# Patient Record
Sex: FEMALE | Race: WHITE | NOT HISPANIC OR LATINO | Employment: FULL TIME | URBAN - METROPOLITAN AREA
[De-identification: names, ages, dates, MRNs, and addresses within clinical notes are randomized per-mention and may not be internally consistent; named-entity substitution may affect disease eponyms.]

---

## 2019-07-15 ENCOUNTER — TRANSFERRED RECORDS (OUTPATIENT)
Dept: MULTI SPECIALTY CLINIC | Facility: CLINIC | Age: 24
End: 2019-07-15

## 2019-07-15 LAB
C TRACH DNA SPEC QL PROBE+SIG AMP: NOT DETECTED
PAP-ABSTRACT: NORMAL
PAP-ABSTRACT: NORMAL

## 2022-10-12 ENCOUNTER — TELEPHONE (OUTPATIENT)
Dept: MIDWIFE SERVICES | Facility: CLINIC | Age: 27
End: 2022-10-12

## 2022-10-21 ENCOUNTER — TELEPHONE (OUTPATIENT)
Dept: OBGYN | Facility: CLINIC | Age: 27
End: 2022-10-21

## 2022-10-21 ENCOUNTER — HOSPITAL ENCOUNTER (OUTPATIENT)
Dept: ULTRASOUND IMAGING | Facility: HOSPITAL | Age: 27
Discharge: HOME | End: 2022-10-21
Attending: ADVANCED PRACTICE MIDWIFE | Admitting: ADVANCED PRACTICE MIDWIFE
Payer: COMMERCIAL

## 2022-10-21 ENCOUNTER — PRENATAL OFFICE VISIT (OUTPATIENT)
Dept: MIDWIFE SERVICES | Facility: CLINIC | Age: 27
End: 2022-10-21
Payer: COMMERCIAL

## 2022-10-21 VITALS
HEIGHT: 68 IN | HEART RATE: 64 BPM | OXYGEN SATURATION: 99 % | WEIGHT: 155 LBS | BODY MASS INDEX: 23.49 KG/M2 | DIASTOLIC BLOOD PRESSURE: 72 MMHG | SYSTOLIC BLOOD PRESSURE: 118 MMHG

## 2022-10-21 DIAGNOSIS — Z13.79 GENETIC SCREENING: ICD-10-CM

## 2022-10-21 DIAGNOSIS — N39.0 RECURRENT URINARY TRACT INFECTION: ICD-10-CM

## 2022-10-21 DIAGNOSIS — Z98.891 HISTORY OF CESAREAN DELIVERY: ICD-10-CM

## 2022-10-21 DIAGNOSIS — O02.1 MISSED ABORTION: Primary | ICD-10-CM

## 2022-10-21 DIAGNOSIS — O09.91 SUPERVISION OF HIGH RISK PREGNANCY IN FIRST TRIMESTER: ICD-10-CM

## 2022-10-21 DIAGNOSIS — O09.91 SUPERVISION OF HIGH RISK PREGNANCY IN FIRST TRIMESTER: Primary | ICD-10-CM

## 2022-10-21 LAB
ABO/RH(D): NORMAL
ANTIBODY SCREEN: NEGATIVE
ERYTHROCYTE [DISTWIDTH] IN BLOOD BY AUTOMATED COUNT: 12.1 % (ref 10–15)
HCT VFR BLD AUTO: 38.5 % (ref 35–47)
HGB BLD-MCNC: 13.1 G/DL (ref 11.7–15.7)
HGB BLD-MCNC: 13.2 G/DL (ref 11.7–15.7)
MCH RBC QN AUTO: 32.1 PG (ref 26.5–33)
MCHC RBC AUTO-ENTMCNC: 34 G/DL (ref 31.5–36.5)
MCV RBC AUTO: 94 FL (ref 78–100)
PLATELET # BLD AUTO: 222 10E3/UL (ref 150–450)
RBC # BLD AUTO: 4.08 10E6/UL (ref 3.8–5.2)
SPECIMEN EXPIRATION DATE: NORMAL
SPECIMEN EXPIRATION DATE: NORMAL
WBC # BLD AUTO: 7.8 10E3/UL (ref 4–11)

## 2022-10-21 PROCEDURE — 83655 ASSAY OF LEAD: CPT | Mod: 90 | Performed by: ADVANCED PRACTICE MIDWIFE

## 2022-10-21 PROCEDURE — 99205 OFFICE O/P NEW HI 60 MIN: CPT | Performed by: ADVANCED PRACTICE MIDWIFE

## 2022-10-21 PROCEDURE — 87340 HEPATITIS B SURFACE AG IA: CPT | Performed by: ADVANCED PRACTICE MIDWIFE

## 2022-10-21 PROCEDURE — 87389 HIV-1 AG W/HIV-1&-2 AB AG IA: CPT | Performed by: ADVANCED PRACTICE MIDWIFE

## 2022-10-21 PROCEDURE — 86780 TREPONEMA PALLIDUM: CPT | Performed by: ADVANCED PRACTICE MIDWIFE

## 2022-10-21 PROCEDURE — 86762 RUBELLA ANTIBODY: CPT | Performed by: ADVANCED PRACTICE MIDWIFE

## 2022-10-21 PROCEDURE — 85027 COMPLETE CBC AUTOMATED: CPT | Performed by: ADVANCED PRACTICE MIDWIFE

## 2022-10-21 PROCEDURE — 99000 SPECIMEN HANDLING OFFICE-LAB: CPT | Performed by: ADVANCED PRACTICE MIDWIFE

## 2022-10-21 PROCEDURE — 86803 HEPATITIS C AB TEST: CPT | Performed by: ADVANCED PRACTICE MIDWIFE

## 2022-10-21 PROCEDURE — 36415 COLL VENOUS BLD VENIPUNCTURE: CPT | Performed by: ADVANCED PRACTICE MIDWIFE

## 2022-10-21 PROCEDURE — 86850 RBC ANTIBODY SCREEN: CPT | Performed by: ADVANCED PRACTICE MIDWIFE

## 2022-10-21 PROCEDURE — 87186 SC STD MICRODIL/AGAR DIL: CPT | Performed by: ADVANCED PRACTICE MIDWIFE

## 2022-10-21 PROCEDURE — 76801 OB US < 14 WKS SINGLE FETUS: CPT

## 2022-10-21 PROCEDURE — 84443 ASSAY THYROID STIM HORMONE: CPT | Performed by: ADVANCED PRACTICE MIDWIFE

## 2022-10-21 PROCEDURE — 87491 CHLMYD TRACH DNA AMP PROBE: CPT | Performed by: ADVANCED PRACTICE MIDWIFE

## 2022-10-21 PROCEDURE — 86900 BLOOD TYPING SEROLOGIC ABO: CPT | Performed by: ADVANCED PRACTICE MIDWIFE

## 2022-10-21 PROCEDURE — 87591 N.GONORRHOEAE DNA AMP PROB: CPT | Performed by: ADVANCED PRACTICE MIDWIFE

## 2022-10-21 PROCEDURE — 87086 URINE CULTURE/COLONY COUNT: CPT | Performed by: ADVANCED PRACTICE MIDWIFE

## 2022-10-21 PROCEDURE — 86901 BLOOD TYPING SEROLOGIC RH(D): CPT | Performed by: ADVANCED PRACTICE MIDWIFE

## 2022-10-21 RX ORDER — BIOTIN 10000 MCG
CAPSULE ORAL
Status: ON HOLD | COMMUNITY
End: 2023-11-09

## 2022-10-21 RX ORDER — MULTIPLE VITAMINS W/ MINERALS TAB 9MG-400MCG
1 TAB ORAL DAILY
COMMUNITY

## 2022-10-21 RX ORDER — CHLORAL HYDRATE 500 MG
2 CAPSULE ORAL DAILY
COMMUNITY

## 2022-10-21 NOTE — TELEPHONE ENCOUNTER
PHONE CALL TO ON-CALL CNM / THEN CNM CALLED PATIENT:  CNM was paged by Dr Patterson of Radiology.    Fabienne was just in for a 1st trimester ultrasound.  Finding of an 8.3 week pregnancy with no heartbeat.  Patient should be 11 weeks by clinical dating.    CNM then spoke with Fabienne and her partner via phone. They were sad about the news, but had known already as the US tech had shared with them during the US that there was no heartbeat.    The couple wanted to know their options.  Discussed expectant management vs medical management vs surgical management with a D&C.  The coupel are uncertain what they will ultimately do, but know they are interested in speaking with a physician to know more about the D&C option.  Referral made to [facility].  They will call the clinic Monday morning.    In the meantime, they will call on-call CNM with bleeding/cramping or with any questions through the weekend.

## 2022-10-21 NOTE — PROGRESS NOTES
PRENATAL VISIT   FIRST OBSTETRICAL EXAM - OB     Assessment / Impression   1.  27-year-old  2 para 1-0-0-1 with presumed IUP at 11 weeks 0 days by approximate LMP  2.  History of  birth for fetal intolerance of labor  3.  Pregravid BMI 24  4.  Recurrent UTIs  5.  History of macrosomic baby  6.  Genetic screening    Plan:   -First trimester ultrasound for dating and viability ordered.  -IOB labs drawn.   -RTC in 2 weeks to hear fetal heart tones and for both influenza and COVID-19 vaccines.  -Pt is a candidate for drawing lead level per [facility] screening tool.   -Patient is interested in waterbirth.    -Reviewed prenatal care schedule.   -Optimal nutrition and weight gain discussed. Pregnancy weight gain of 25-35 lbs (BMI 18.5-24.9) encouraged.   -Anticipatory guidance for common pregnancy questions and concerns reviewed.   -Danger s/sx for this trimester reviewed with patient.   -Reviewed genetic screening options with patient, patient does elect for noninvasive prenatal testing.  She DECLINES first trimester screening including lab work and early ultrasound with M. The patient does not elect for quad screening.   -Reviewed carrier testing options with patient, patient does not elect for testing or referral to genetic counseling.  -IOB packet given and reviewed with patient.   -CNM services and hospital options reviewed; emergency and scheduling phone numbers given to patient.   -Because the patient does not have 1 high risk nor 2 or more moderate risk factors, low-dose aspirin not be initiated.   -Antepartum VTE risk factors absent.  -Patient is not at increased risk for overt diabetes, so early 1hr GCT will not be added to IOB labs today.  -Pt is a candidate for an antepartum OB consult.  Between 24 and 28 weeks, she will be referred to OB/GYN for a TOLAC/ consult.  -Return to clinic in 4 weeks or sooner as needed.    Total time: 70 minutes spent on the date of the encounter doing chart review,  review of test results, patient visit and documentation.     Subjective:   Fabienne Clemente is a 27 year old G 2 P 1001 here today for her first obstetrical exam at 11 w 0 d. Here with Nikhil. This pregnancy is planned. The patient reports nausea, but no vomiting, fatigue and some mild breast tenderness.  Patient's last menstrual period was 2022 (approximate)., predicting an expected date of delivery of Estimated Date of Delivery: May 12, 2023. Last period was regular. Her previous three cycles were regular. Her pregnancy is dated by approximate LMP.     The patient states that she is in a monogamous relationship. Offered GC/CT screening today and patient accepts.  Current symptoms also include: breast tenderness, nausea, positive home pregnancy test and Although symptoms of pregnancy have waned recently..     Risk factors: None. Pregnancy Risk Factors:History of  birth    The patient has the following high risk factors for preeclampsia:none. The patient has the following moderate risk factors for preeclampsia:none.     The patient has the following antepartum risk factors for VTE (two or more risk factors, or 1 * risk factor, places patient at higher risk): none.   Clinical history/risk factors requiring antepartum OB consult: history of  section- desires TOLAC.     The patient has the following risk factors for overt diabetes: Not at increased risk, BMI normal (or under 23 for  Americans). Plus the additional risk factor(s) of: No additional risk factors.    Social History:   Education level: College graduate  Occupation:   Partners name: Nikhil,   ?   OB History    Para Term  AB Living   2 1 1 0 0 1   SAB IAB Ectopic Multiple Live Births   0 0 0 0 1      # Outcome Date GA Lbr Jerzy/2nd Weight Sex Delivery Anes PTL Lv   2 Current            1 Term 21 40w5d  4.167 kg (9 lb 3 oz) F CS-LTranv  N CLAUDIO      Birth Comments: fetal intolerance at 7 cm      Name: Bridget  "     Apgar1: 8  Apgar5: 9        History:   Past Medical History:   Diagnosis Date     Recurrent urinary tract infection       Past Surgical History:   Procedure Laterality Date      SECTION        Family History   Problem Relation Age of Onset     No Known Problems Mother      No Known Problems Father      Bipolar Disorder Maternal Grandfather      Lung Cancer Paternal Grandmother      Colon Cancer Paternal Grandfather      Hypertension Paternal Grandfather      No Known Problems Sister      No Known Problems Daughter      No Known Problems Paternal Half-Brother      No Known Problems Paternal Half-Sister      No Known Problems Paternal Half-Sister       Social History     Tobacco Use     Smoking status: Never     Smokeless tobacco: Never   Substance Use Topics     Alcohol use: Not Currently     Drug use: Never      Current Outpatient Medications   Medication Sig Dispense Refill     Biotin 10 MG CAPS        fish oil-omega-3 fatty acids 1000 MG capsule Take 2 g by mouth daily       multivitamin w/minerals (THERA-VIT-M) tablet Take 1 tablet by mouth daily       Probiotic Product (PROBIOTIC-10 PO)        nitroFURantoin macrocrystal-monohydrate (MACROBID) 100 MG capsule Take 1 capsule (100 mg) by mouth 2 times daily 14 capsule 0      No Known Allergies     The patient's medical, surgical and family histories were reviewed and were not pertinent to this visit.     Pap smear: Last Pap: , Result: Normal, Previous History: Normal, Any history of abnormal: No. Next Due: .     EPDS score today:  .\"  0\" to #10   History of anxiety or depression: Denies    Review of Systems   General: Fatigue but otherwise denies problem   Eyes: Denies problem   Ears/Nose/Throat: Denies problem   Cardiovascular: Denies problem   Respiratory: Denies problem   Gastrointestinal: Nausea without vomiting, otherwise negative   Genitourinary: Denies any discharge, vaginal bleeding or itchiness or any other problem " "  Musculoskeletal: Breast tenderness otherwise denies problem   Skin: Denies problem   Neurologic: Denies problem   Psychiatric: Denies problem   Endocrine: Denies problem   Heme/Lymphatic: Denies problem   Allergic/Immunologic: Denies problem         Objective:   Objective    Vitals:    10/21/22 1521   BP: 118/72   Pulse: 64   SpO2: 99%   Weight: 70.3 kg (155 lb)   Height: 1.715 m (5' 7.5\")        Physical Exam:   General Appearance: Alert, cooperative, no distress, appears stated age   ALICIA: Normocephalic, without obvious abnormality, atraumatic. Conjunctiva/corneas clear, does not wear corrective lenses   Neck: Supple, symmetrical, trachea midline, no adenopathy.   Thyroid: not enlarged, symmetric, no tenderness/mass/nodules   Back: Symmetric, ROM normal   Breasts: Deferred  Abdomen: Soft, non-tender.   FHT: Not heard  Pelvic exam: Deferred  Musculoskeletal: Extremities normal, atraumatic, no cyanosis   Skin: Skin color, texture, turgor normal, no rashes or lesions   Neurologic: Alert and oriented x 3. Normal speech     "

## 2022-10-22 ENCOUNTER — TELEPHONE (OUTPATIENT)
Dept: MIDWIFE SERVICES | Facility: CLINIC | Age: 27
End: 2022-10-22

## 2022-10-22 ENCOUNTER — NURSE TRIAGE (OUTPATIENT)
Dept: NURSING | Facility: CLINIC | Age: 27
End: 2022-10-22

## 2022-10-22 DIAGNOSIS — N30.00 ACUTE CYSTITIS WITHOUT HEMATURIA: Primary | ICD-10-CM

## 2022-10-22 PROBLEM — O02.1 MISSED ABORTION: Status: ACTIVE | Noted: 2022-10-22

## 2022-10-22 LAB
BACTERIA UR CULT: ABNORMAL
C TRACH DNA SPEC QL NAA+PROBE: NEGATIVE
HBV SURFACE AG SERPL QL IA: NONREACTIVE
HCV AB SERPL QL IA: NONREACTIVE
HIV 1+2 AB+HIV1 P24 AG SERPL QL IA: NONREACTIVE
N GONORRHOEA DNA SPEC QL NAA+PROBE: NEGATIVE
T PALLIDUM AB SER QL: NONREACTIVE

## 2022-10-22 RX ORDER — NITROFURANTOIN 25; 75 MG/1; MG/1
100 CAPSULE ORAL 2 TIMES DAILY
Qty: 14 CAPSULE | Refills: 0 | Status: SHIPPED | OUTPATIENT
Start: 2022-10-22 | End: 2022-11-07

## 2022-10-23 ENCOUNTER — NURSE TRIAGE (OUTPATIENT)
Dept: NURSING | Facility: CLINIC | Age: 27
End: 2022-10-23

## 2022-10-23 DIAGNOSIS — N39.0 RECURRENT URINARY TRACT INFECTION: Primary | ICD-10-CM

## 2022-10-23 DIAGNOSIS — N30.00 ACUTE CYSTITIS WITHOUT HEMATURIA: ICD-10-CM

## 2022-10-23 PROBLEM — Z13.79 GENETIC SCREENING: Status: ACTIVE | Noted: 2022-10-23

## 2022-10-23 PROBLEM — O09.91 SUPERVISION OF HIGH RISK PREGNANCY IN FIRST TRIMESTER: Status: ACTIVE | Noted: 2022-10-23

## 2022-10-23 PROBLEM — Z98.891 HISTORY OF CESAREAN DELIVERY: Status: ACTIVE | Noted: 2022-10-23

## 2022-10-23 NOTE — TELEPHONE ENCOUNTER
OB Triage Call    Is patient's OB/Midwife with the [facility]? [facility]- Is patient's primary OB a Midwife? Yes- At this time we do not triage for the [facility] midwives.  Paged on-call Midwife Leola Nunes at 8:45PM to contact patient directly.     Pt is requesting to speak with the midwife about labs she had drawn.       11w1d    Estimated Date of Delivery: May 12, 2023        OB History    Para Term  AB Living   1 0 0 0 0 0   SAB IAB Ectopic Multiple Live Births   0 0 0 0 0      # Outcome Date GA Lbr Jerzy/2nd Weight Sex Delivery Anes PTL Lv   1 Current                No results found for: GBS       Katelyn Boss RN 10/22/22 8:49 PM  [facility] Nurse Advisor      Reason for Disposition    [1] Caller requests to speak ONLY to PCP AND [2] URGENT question    Additional Information    Negative: [1] Follow-up call from patient regarding patient's clinical status AND [2] information urgent    Negative: Lab or radiology calling with CRITICAL test results    Negative: Call about patient who is currently hospitalized    Negative: Doctor (or NP/PA) call to PCP    Negative: ED call to PCP (i.e., primary care provider; doctor, NP, or PA)    Negative: Lab calling with strep throat test results and triager can call in prescription    Negative: Lab calling with urinalysis test results and triager can call in prescription    Negative: Medication questions    Negative: Medication renewal and refill questions    Negative: Pre-operative or pre-procedural questions    Protocols used: [facility]

## 2022-10-23 NOTE — TELEPHONE ENCOUNTER
Call from patient who would like to the speak to one of the midwives at [facility]. She reports she spoke to Janet Costa today. She has questions about her miscarriage.    Caller was transferred to midwife line per policy.    [facility] RN/[facility] Nurse Advisors      Reason for Disposition    General information question, no triage required and triager able to answer question    Protocols used: INFORMATION ONLY CALL - NO TRIAGE-A-

## 2022-10-23 NOTE — TELEPHONE ENCOUNTER
"Telephone call from Fabienne wanting more information about details of ultrasound results. Specifically has questions about the language \"irregular gestational sac and yolk sac\". Wondering if this indicates a chromosomal abnormality. Discussed that this does not give us any information about the fetal chromosomes, but often occurs when a pregnancy is not continuing to grow. Patient states that she hopes to have a consult appointment with an OB as soon as possible and is wanting as many answers as she can get about why this happened in hopes to avoid a reaccurance. Validation of patient's feelings about wanting answers. Discussed that with first trimester miscarriages sometimes you are not able to get an answer to why, patient continues to be hopeful to get some answers. States that her uterus is off to her right, wondering if that could be a cause. Discussed that is not likely the cause, but encouraged patient ask as many questions as she needs of the OB and advocate for any testing she would find helpful. Also reports no family hx on either side. Reviewed that miscarriages, especially first trimester can happen despite no risk factors or family history. Again validated patients desire to understand what happened despite the fact she may not get the answers she desires.  Discussed that in addition to a lab work, chromosomal testing can be completed on the POC if patient desires and may or may not give her answers. Patient states understanding and continues to feel very strongly that she needs answers. No further questions for this writer at this time.     GRIS Young, CJM  [facility] Nurse-Midwives [facility]    "

## 2022-10-24 ENCOUNTER — TELEPHONE (OUTPATIENT)
Dept: UROLOGY | Facility: CLINIC | Age: 27
End: 2022-10-24

## 2022-10-24 ENCOUNTER — TELEPHONE (OUTPATIENT)
Dept: MIDWIFE SERVICES | Facility: CLINIC | Age: 27
End: 2022-10-24

## 2022-10-24 ENCOUNTER — TELEPHONE (OUTPATIENT)
Dept: MATERNAL FETAL MEDICINE | Facility: CLINIC | Age: 27
End: 2022-10-24

## 2022-10-24 ENCOUNTER — MYC MEDICAL ADVICE (OUTPATIENT)
Dept: MIDWIFE SERVICES | Facility: CLINIC | Age: 27
End: 2022-10-24

## 2022-10-24 DIAGNOSIS — O02.1 MISSED ABORTION: Primary | ICD-10-CM

## 2022-10-24 LAB
RUBV IGG SERPL QL IA: 1.89 INDEX
RUBV IGG SERPL QL IA: POSITIVE
TSH SERPL DL<=0.005 MIU/L-ACNC: 1.34 UIU/ML (ref 0.3–4.2)

## 2022-10-24 NOTE — TELEPHONE ENCOUNTER
Left generic VM requesting callback to PCC line. Need to clarify type of testing pt desires, whether she has elected for surgical management of missed AB, and whether she will be receiving further OB care from [facility]. Will await callback.     Pt returning call. Clarified that [facility] for management of [facility]AB. Pt strongly desires genetic testing of POC, clarified that [facility] (or OB service providing surgical procedure) would be the coordinator of this testing and that she can discuss that with [facility] when they return her call. Pt aware that we could assist her in coordinating D&C and POC testing at [facility]. Pt also desires carrier screening for herself and partner. Order placed for scheduling with [facility].

## 2022-10-24 NOTE — TELEPHONE ENCOUNTER
[facility] Call Center    Phone Message    May a detailed message be left on voicemail: yes     Reason for Call: Appointment Intake    Referring Provider Name: Janet Costa PITER    Diagnosis and/or Symptoms: Elease evaluate for recurrent urinary tract infections since childhood. Patient recently suffered a miscarriage and is currently being treated for an asymptomatic urinary tract infection detected on a urine culture at her initial OB visit.    Patient being referred for Urgent Appointment (Priority: 1-2 Weeks) by referring provider. Sending encounter message for clinic review and follow-up with patient for scheduling.     Action Taken: Message routed to:  [facility] Urology     Travel Screening: Not Applicable

## 2022-10-24 NOTE — TELEPHONE ENCOUNTER
Patient diagnosed with 8+3-week miscarriage on Friday, 10/21/2022.  Requesting referral for genetic counseling and testing.  Referral placed to [facility].  All questions answered.

## 2022-10-25 ENCOUNTER — LAB REQUISITION (OUTPATIENT)
Dept: LAB | Facility: CLINIC | Age: 27
End: 2022-10-25
Payer: COMMERCIAL

## 2022-10-25 ENCOUNTER — TRANSFERRED RECORDS (OUTPATIENT)
Dept: HEALTH INFORMATION MANAGEMENT | Facility: CLINIC | Age: 27
End: 2022-10-25

## 2022-10-25 ENCOUNTER — HOSPITAL ENCOUNTER (OUTPATIENT)
Facility: AMBULATORY SURGERY CENTER | Age: 27
End: 2022-10-25
Attending: OBSTETRICS & GYNECOLOGY
Payer: COMMERCIAL

## 2022-10-25 DIAGNOSIS — O02.1 MISSED ABORTION: ICD-10-CM

## 2022-10-25 LAB
FACTOR V INTERPRETATION: NORMAL
LAB DIRECTOR COMMENTS: NORMAL
LAB DIRECTOR DISCLAIMER: NORMAL
LAB DIRECTOR INTERPRETATION: NORMAL
LAB DIRECTOR METHODOLOGY: NORMAL
LAB DIRECTOR RESULTS: NORMAL
LEAD BLDV-MCNC: <2 UG/DL
SPECIMEN DESCRIPTION: NORMAL

## 2022-10-25 PROCEDURE — 85730 THROMBOPLASTIN TIME PARTIAL: CPT | Mod: ORL | Performed by: OBSTETRICS & GYNECOLOGY

## 2022-10-25 PROCEDURE — 85300 ANTITHROMBIN III ACTIVITY: CPT | Mod: ORL | Performed by: OBSTETRICS & GYNECOLOGY

## 2022-10-25 PROCEDURE — 85303 CLOT INHIBIT PROT C ACTIVITY: CPT | Mod: ORL | Performed by: OBSTETRICS & GYNECOLOGY

## 2022-10-25 PROCEDURE — 86777 TOXOPLASMA ANTIBODY: CPT | Mod: ORL | Performed by: OBSTETRICS & GYNECOLOGY

## 2022-10-25 PROCEDURE — 81241 F5 GENE: CPT | Mod: ORL | Performed by: OBSTETRICS & GYNECOLOGY

## 2022-10-25 RX ORDER — LIDOCAINE 40 MG/G
CREAM TOPICAL
Status: CANCELLED | OUTPATIENT
Start: 2022-10-25

## 2022-10-25 RX ORDER — ONDANSETRON 2 MG/ML
4 INJECTION INTRAMUSCULAR; INTRAVENOUS EVERY 30 MIN PRN
Status: CANCELLED | OUTPATIENT
Start: 2022-10-25

## 2022-10-25 RX ORDER — FENTANYL CITRATE 0.05 MG/ML
25 INJECTION, SOLUTION INTRAMUSCULAR; INTRAVENOUS
Status: CANCELLED | OUTPATIENT
Start: 2022-10-25

## 2022-10-25 RX ORDER — OXYCODONE HYDROCHLORIDE 5 MG/1
5 TABLET ORAL EVERY 4 HOURS PRN
Status: CANCELLED | OUTPATIENT
Start: 2022-10-25

## 2022-10-25 RX ORDER — HYDROMORPHONE HCL IN WATER/PF 6 MG/30 ML
0.2 PATIENT CONTROLLED ANALGESIA SYRINGE INTRAVENOUS EVERY 5 MIN PRN
Status: CANCELLED | OUTPATIENT
Start: 2022-10-25

## 2022-10-25 RX ORDER — SODIUM CHLORIDE, SODIUM LACTATE, POTASSIUM CHLORIDE, CALCIUM CHLORIDE 600; 310; 30; 20 MG/100ML; MG/100ML; MG/100ML; MG/100ML
INJECTION, SOLUTION INTRAVENOUS CONTINUOUS
Status: CANCELLED | OUTPATIENT
Start: 2022-10-25

## 2022-10-25 RX ORDER — ONDANSETRON 4 MG/1
4 TABLET, ORALLY DISINTEGRATING ORAL EVERY 30 MIN PRN
Status: CANCELLED | OUTPATIENT
Start: 2022-10-25

## 2022-10-25 RX ORDER — FENTANYL CITRATE 0.05 MG/ML
25 INJECTION, SOLUTION INTRAMUSCULAR; INTRAVENOUS EVERY 5 MIN PRN
Status: CANCELLED | OUTPATIENT
Start: 2022-10-25

## 2022-10-25 RX ORDER — MEPERIDINE HYDROCHLORIDE 25 MG/ML
12.5 INJECTION INTRAMUSCULAR; INTRAVENOUS; SUBCUTANEOUS
Status: CANCELLED | OUTPATIENT
Start: 2022-10-25

## 2022-10-25 NOTE — TELEPHONE ENCOUNTER
MEDICAL RECORDS REQUEST   [facility]  Urology Clinic  [address] [place] [address]  PHONE: [phone]  Fax: [phone]        FUTURE VISIT INFORMATION                                                   Fabienne J May, : 1995 scheduled for future visit at [facility]    APPOINTMENT INFORMATION:    Date: 2022    Provider:  Rebecca Honeycutt CNP    Reason for Visit/Diagnosis: Recurrent urinary tract infections since childhood    REFERRAL INFORMATION:    Referring provider:  Janet Costa CNM in [facility]    RECORDS REQUESTED FOR VISIT                                                     NOTES  STATUS/DETAILS   OFFICE NOTE from referring provider  yes, 10/21/2022 -- Janet Costa CNM in [facility]   OFFICE NOTE from other specialist  yes, 2022 -- Balbina Malcolm APRN, CNM @   07/15/2019 -- Kadie Gilliam APRN, CNP @    MEDICATION LIST  yes   LABS     URINALYSIS (UA)  yes     PRE-VISIT CHECKLIST      Record collection complete Yes   Appointment appropriately scheduled           (right time/right provider) Yes   Joint diagnostic appointment coordinated correctly          (ensure right order & amount of time) Yes   MyChart activation Yes   Questionnaire complete If no, please explain pending

## 2022-10-27 ENCOUNTER — PRE VISIT (OUTPATIENT)
Dept: UROLOGY | Facility: CLINIC | Age: 27
End: 2022-10-27

## 2022-10-27 LAB
T GONDII IGG SER-ACNC: <3 IU/ML
T GONDII IGM SER-ACNC: <3 AU/ML

## 2022-10-27 PROCEDURE — G0452 MOLECULAR PATHOLOGY INTERPR: HCPCS | Mod: 26 | Performed by: STUDENT IN AN ORGANIZED HEALTH CARE EDUCATION/TRAINING PROGRAM

## 2022-10-27 NOTE — TELEPHONE ENCOUNTER
Reason for visit: consult      Dx/Hx/Sx: Princess since childhood, recent miscarriage     Records/imaging/labs/orders: in epic     At Rooming: standard

## 2022-10-28 LAB
AT III ACT/NOR PPP CHRO: 103 % (ref 85–135)
DRVVT SCREEN RATIO: 0.88
INR PPP: 1.08 (ref 0.85–1.15)
LA PPP-IMP: NEGATIVE
LUPUS INTERPRETATION: NORMAL
PROT C ACT/NOR PPP CHRO: 87 % (ref 70–170)
PTT RATIO: 1.03
THROMBIN TIME: 17 SECONDS (ref 13–19)

## 2022-10-30 ENCOUNTER — TELEPHONE (OUTPATIENT)
Dept: MIDWIFE SERVICES | Facility: CLINIC | Age: 27
End: 2022-10-30

## 2022-10-30 ENCOUNTER — DOCUMENTATION ONLY (OUTPATIENT)
Dept: MIDWIFE SERVICES | Facility: CLINIC | Age: 27
End: 2022-10-30

## 2022-10-30 NOTE — TELEPHONE ENCOUNTER
A: 1.  27-year-old  2 para 1-0-1-1  2.  Spontaneous miscarriage of IUP at 8 weeks 4 days    P: Emotional support and therapeutic listening provided.  Normal course following miscarriage reviewed.  This writer will speak with in-house obstetrician from [facility] in the morning to ask about the logistics of sending products of conception tissue in to the office or through [employer] for chromosomal analysis.  Recommend home urine hCG in 2 weeks.  If it is still positive, follow-up with CNM or OB/GYN.  No ultrasound indicated at this time.    S: Experienced miscarriage tonight after taking black cohosh and vitamin C over the last couple of days.  She saw Dr. Cunningham at [facility] on Tuesday, 10/25/2022.  She has a container in order to send products of conception in for chromosomal testing.  She like to know what to do with the container since it is late Saturday night.  She has [employer] information but tomorrow is .  After passing clots and tissue on the toilet, her bleeding has become like a period.  She is not cramping anymore.  She did not need to take the prescribed misoprostol from Dr. Cunningham.    O: Alert and in no apparent distress    10/30/2022 9:46 AM    Spoke with Dr. Khan who will contact the patient regarding process for chromosomal analysis of products of conception after miscarriage.

## 2022-10-30 NOTE — PROGRESS NOTES
Phone Note: Fabienne Clemente is a 27 year old  who recently miscarried and called the on-call CNM regarding bleeding questions. She believes she passed the POC last evening around 1030pm, but since then she has noticed increased cramping and passing of blood clots. She notices blood clots both smaller, like dime size, and bigger, like ping pong ball size today. Last night she did have some golf ball size clots as well. She doesn't feel like the bleeding is slowing down since she passed what she thought was the baby. She is feeling dizzy as well. Temp has been around 99 F. We discussed normal course of bleeding with miscarriage and this does seem to be outside of that normal range, particularly due to her continued cramping, clots, and dizziness. She doesn't think she is eating and drinking enough at this time so the dizziness may be related to that as well. I encouraged she go in to the ER for evaluation to ensure her bleeding is under control and all POC have passed. She is aware that an US would help determine this. She is hesitant to want to go int but aware that it is recommended. She desires to wait at home for one more hour to see if the bleeding slows and cramping improves. I recommended if she doesn't see a big change in her bleeding, to go in. She does have someone to drive her to the ER.

## 2022-10-31 LAB — SCANNED LAB RESULT: ABNORMAL

## 2022-11-01 ENCOUNTER — OFFICE VISIT (OUTPATIENT)
Dept: UROLOGY | Facility: CLINIC | Age: 27
End: 2022-11-01
Payer: COMMERCIAL

## 2022-11-01 ENCOUNTER — PRE VISIT (OUTPATIENT)
Dept: UROLOGY | Facility: CLINIC | Age: 27
End: 2022-11-01

## 2022-11-01 VITALS
HEART RATE: 81 BPM | HEIGHT: 67 IN | BODY MASS INDEX: 24.01 KG/M2 | SYSTOLIC BLOOD PRESSURE: 145 MMHG | WEIGHT: 153 LBS | DIASTOLIC BLOOD PRESSURE: 80 MMHG

## 2022-11-01 DIAGNOSIS — N39.0 RECURRENT URINARY TRACT INFECTION: ICD-10-CM

## 2022-11-01 DIAGNOSIS — N30.00 ACUTE CYSTITIS WITHOUT HEMATURIA: ICD-10-CM

## 2022-11-01 PROCEDURE — 99204 OFFICE O/P NEW MOD 45 MIN: CPT | Performed by: NURSE PRACTITIONER

## 2022-11-01 RX ORDER — CEPHALEXIN 500 MG/1
500 CAPSULE ORAL PRN
Qty: 30 CAPSULE | Refills: 5 | Status: SHIPPED | OUTPATIENT
Start: 2022-11-01 | End: 2023-02-03

## 2022-11-01 ASSESSMENT — PAIN SCALES - GENERAL: PAINLEVEL: NO PAIN (0)

## 2022-11-01 NOTE — PATIENT INSTRUCTIONS
UROLOGY CLINIC VISIT PATIENT INSTRUCTIONS    Thanks for your patience with me today!    -We will obtain a renal ultrasound to check your kidneys to ensure nothing contributing to your infections.  -I have sent the Keflex antibiotic to your pharmacy in [place]. Take one dose after intercourse.   -Follow up with me in 3 months to check in via virtual visit.     If you have any issues, questions or concerns in the meantime, do not hesitate to contact us at [phone] or via [employer].     Rebecca Honeycutt, CNP  Department of Urology

## 2022-11-01 NOTE — PROGRESS NOTES
Assessment and Plan:     Assessment: 27 year old female with a history of recurrent UTIs, including recent asymptomatic bacteriuria at the time of unfortunately miscarriage. Infections for many years, dating back to childhood, with workup done at that time, though no records of this to review. Given her age, recommend a trial of post-coital prophylaxis to rule this out and she agrees with this plan. Will also obtain a GISELE to ensure no upper-tract nidus, including stones. PVR low today.     Plan:  -Start Keflex 500 mg as needed after intercourse.   -GISELE to rule out upper tract nidus.   -Follow up with me in 3 months to check in on progress with this. If she continues to get infections despite this, may consider referring for cystoscopy to evaluate lower urinary tract.       Rebecca Honeycutt, CNP  Department of Urology           Chief Complaint:   Recurrent UTIs         History of Present Illness:    Fabienne Clemente is a 27 year old female who presents for consult regarding recurrent UTIs. She recently suffered a miscarriage and was treated with an asymptomatic bacteriuria, with culture growing E.coli.     She is having a difficult time coping with her recent miscarriage. She wishes this was something that was discussed more openly to help normalize it.     Regarding her UTIs, she states that these date back to childhood. She recalls having a procedure done at a young age, which sounds like a cystoscopy by her description. She does not recall that anything came of this. Her UTIs worsened during her college years, which she does feel was likely due to an increase in new sexual partners. She is now  and monogamous and therefore no longer has this same exposure risk. She does not feel that there is a clear link between intercourse and infections. She does feel that her urine develops an unusual odor when she gets an infection. It does not smell bad, just unusual. She sometimes feels that she is not completely  "emptying her bladder.          Past Medical History:     Past Medical History:   Diagnosis Date     Recurrent urinary tract infection             Past Surgical History:     Past Surgical History:   Procedure Laterality Date      SECTION              Medications     Current Outpatient Medications   Medication     Biotin 10 MG CAPS     cephALEXin (KEFLEX) 500 MG capsule     fish oil-omega-3 fatty acids 1000 MG capsule     multivitamin w/minerals (THERA-VIT-M) tablet     Probiotic Product (PROBIOTIC-10 PO)     nitroFURantoin macrocrystal-monohydrate (MACROBID) 100 MG capsule     No current facility-administered medications for this visit.            Allergies:   Patient has no known allergies.         Review of Systems:  From intake questionnaire   Negative 14 system review except as noted on HPI, nurse's note.         Physical Exam:   Patient is a 27 year old  female   Vitals: Blood pressure (!) 145/80, pulse 81, height 1.695 m (5' 6.75\"), weight 69.4 kg (153 lb), not currently breastfeeding.  General Appearance Adult: Alert, no acute distress, oriented  Lungs: no respiratory distress, or pursed lip breathing  Heart: No obvious jugular venous distension present  Abdomen: soft, nontender, no organomegaly or masses, Body mass index is 24.14 kg/m .  : deferred     Uroflow and Post-Void Residual by Bladder Scan     PVR: 69 mL      Labs and Pathology:    I personally reviewed all applicable laboratory data and went over findings with patient  Significant for:    CBC RESULTS:  Recent Labs   Lab Test 10/21/22  1601   WBC 7.8   HGB 13.1  13.2          INR  Recent Labs   Lab Test 10/25/22  1524   INR 1.08     "

## 2022-11-01 NOTE — LETTER
11/1/2022       RE: Fabienne Clemente  [address]  [facility] [address]     Dear Colleague,    Thank you for referring your patient, Fabienne Clemente, to the [facility]OLOG[facility] [place] at [facility]. Please see a copy of my visit note below.         Assessment and Plan:     Assessment: 27 year old female with a history of recurrent UTIs, including recent asymptomatic bacteriuria at the time of unfortunately miscarriage. Infections for many years, dating back to childhood, with workup done at that time, though no records of this to review. Given her age, recommend a trial of post-coital prophylaxis to rule this out and she agrees with this plan. Will also obtain a GISELE to ensure no upper-tract nidus, including stones. PVR low today.     Plan:  -Start Keflex 500 mg as needed after intercourse.   -GISELE to rule out upper tract nidus.   -Follow up with me in 3 months to check in on progress with this. If she continues to get infections despite this, may consider referring for cystoscopy to evaluate lower urinary tract.       Rebecca Honeycutt, CNP  Department of Urology           Chief Complaint:   Recurrent UTIs         History of Present Illness:    Fabienne Clemente is a 27 year old female who presents for consult regarding recurrent UTIs. She recently suffered a miscarriage and was treated with an asymptomatic bacteriuria, with culture growing E.coli.     She is having a difficult time coping with her recent miscarriage. She wishes this was something that was discussed more openly to help normalize it.     Regarding her UTIs, she states that these date back to childhood. She recalls having a procedure done at a young age, which sounds like a cystoscopy by her description. She does not recall that anything came of this. Her UTIs worsened during her college years, which she does feel was likely due to an increase in new sexual partners. She is now  and monogamous  "and therefore no longer has this same exposure risk. She does not feel that there is a clear link between intercourse and infections. She does feel that her urine develops an unusual odor when she gets an infection. It does not smell bad, just unusual. She sometimes feels that she is not completely emptying her bladder.          Past Medical History:     Past Medical History:   Diagnosis Date     Recurrent urinary tract infection             Past Surgical History:     Past Surgical History:   Procedure Laterality Date      SECTION              Medications     Current Outpatient Medications   Medication     Biotin 10 MG CAPS     cephALEXin (KEFLEX) 500 MG capsule     fish oil-omega-3 fatty acids 1000 MG capsule     multivitamin w/minerals (THERA-VIT-M) tablet     Probiotic Product (PROBIOTIC-10 PO)     nitroFURantoin macrocrystal-monohydrate (MACROBID) 100 MG capsule     No current facility-administered medications for this visit.            Allergies:   Patient has no known allergies.         Review of Systems:  From intake questionnaire   Negative 14 system review except as noted on HPI, nurse's note.         Physical Exam:   Patient is a 27 year old  female   Vitals: Blood pressure (!) 145/80, pulse 81, height 1.695 m (5' 6.75\"), weight 69.4 kg (153 lb), not currently breastfeeding.  General Appearance Adult: Alert, no acute distress, oriented  Lungs: no respiratory distress, or pursed lip breathing  Heart: No obvious jugular venous distension present  Abdomen: soft, nontender, no organomegaly or masses, Body mass index is 24.14 kg/m .  : deferred     Uroflow and Post-Void Residual by Bladder Scan     PVR: 69 mL      Labs and Pathology:    I personally reviewed all applicable laboratory data and went over findings with patient  Significant for:    CBC RESULTS:  Recent Labs   Lab Test 10/21/22  1601   WBC 7.8   HGB 13.1  13.2          INR  Recent Labs   Lab Test 10/25/22  1524   INR 1.08         "

## 2022-11-01 NOTE — NURSING NOTE
"Chief Complaint   Patient presents with     Consult     Dysuria with frequency and dehydration        Blood pressure (!) 145/80, pulse 81, height 1.695 m (5' 6.75\"), weight 69.4 kg (153 lb), not currently breastfeeding. Body mass index is 24.14 kg/m .    Patient Active Problem List   Diagnosis     Missed      History of  delivery     Supervision of high risk pregnancy in first trimester     BMI 24.0-24.9, adult     Recurrent urinary tract infection     Macrosomic baby     Genetic screening     Acute cystitis without hematuria       No Known Allergies    Current Outpatient Medications   Medication Sig Dispense Refill     Biotin 10 MG CAPS        fish oil-omega-3 fatty acids 1000 MG capsule Take 2 g by mouth daily       multivitamin w/minerals (THERA-VIT-M) tablet Take 1 tablet by mouth daily       Probiotic Product (PROBIOTIC-10 PO)        nitroFURantoin macrocrystal-monohydrate (MACROBID) 100 MG capsule Take 1 capsule (100 mg) by mouth 2 times daily (Patient not taking: Reported on 2022) 14 capsule 0       Social History     Tobacco Use     Smoking status: Never     Smokeless tobacco: Never   Substance Use Topics     Alcohol use: Not Currently     Drug use: Never       Negar Alatorre CMA  2022  3:38 PM     "

## 2022-11-03 PROBLEM — O28.5 MATERNAL SERUM SCREEN POSITIVE FOR TRISOMY 21: Status: ACTIVE | Noted: 2022-11-03

## 2022-12-07 ENCOUNTER — ANCILLARY PROCEDURE (OUTPATIENT)
Dept: ULTRASOUND IMAGING | Facility: CLINIC | Age: 27
End: 2022-12-07
Attending: NURSE PRACTITIONER
Payer: COMMERCIAL

## 2022-12-07 DIAGNOSIS — N39.0 RECURRENT URINARY TRACT INFECTION: ICD-10-CM

## 2022-12-07 PROCEDURE — 76770 US EXAM ABDO BACK WALL COMP: CPT | Mod: TC | Performed by: RADIOLOGY

## 2022-12-27 ENCOUNTER — PRE VISIT (OUTPATIENT)
Dept: UROLOGY | Facility: CLINIC | Age: 27
End: 2022-12-27

## 2022-12-27 NOTE — TELEPHONE ENCOUNTER
Reason for visit: follow up      Dx/Hx/Sx: [place], cystitis     Records/imaging/labs/orders: renal US in epic     At Rooming: video visit

## 2023-02-03 ENCOUNTER — VIRTUAL VISIT (OUTPATIENT)
Dept: UROLOGY | Facility: CLINIC | Age: 28
End: 2023-02-03
Payer: COMMERCIAL

## 2023-02-03 DIAGNOSIS — N39.0 RECURRENT UTI: Primary | ICD-10-CM

## 2023-02-03 DIAGNOSIS — N39.0 RECURRENT URINARY TRACT INFECTION: ICD-10-CM

## 2023-02-03 PROCEDURE — 99213 OFFICE O/P EST LOW 20 MIN: CPT | Mod: GT | Performed by: NURSE PRACTITIONER

## 2023-02-03 RX ORDER — CEPHALEXIN 500 MG/1
500 CAPSULE ORAL PRN
Qty: 30 CAPSULE | Refills: 8 | Status: SHIPPED | OUTPATIENT
Start: 2023-02-03 | End: 2023-02-20 | Stop reason: ALTCHOICE

## 2023-02-03 NOTE — PROGRESS NOTES
Fabienne is a 27 year old who is being evaluated via a billable video visit.      How would you like to obtain your AVS? MyChart  If the video visit is dropped, the invitation should be resent by: Send to e-mail at: [email or web address]  Will anyone else be joining your video visit? No    Video-Visit Details    Type of service:  Video Visit   Video start time: 9:00 AM  Vide end time: 9:13 AM    Originating Location (pt. Location): Home  Distant Location (provider location):  Off-site  Platform used for Video Visit: William       Fabienne Clemente complains of   Chief Complaint   Patient presents with     Video Visit     3 month follow up        Assessment/Plan:  27 year old female with a history of recurrent UTIs, now on post-coital prophylaxis with Keflex 500 mg PRN. Has done well since our last visit, though sometimes has mild symptoms if she becomes dehydrated. Hesitant about staying on Keflex long-term due to concern for antibiotic resistance. However, will continue this for now. As she is hoping to conceive another child, we discussed that this is pregnancy category B and safe to continue if needed to prevent infections. We also discussed methenamine in brief as an alternative to antibiotics, which is pregnancy cat C.   -She will continue Keflex as needed. Refills sent today.   -Will obtain a UA/UC now to make sure there is no bacteria currently present, given her previous episode.   -She will follow up with me in one year, or sooner if needed.     Rebecca Honeycutt, CNP  Department of Urology      Subjective:   27 year old female with a history of recurrent UTIs, including asymptomatic bacteriuria at the time of unfortunate miscarriage a few months ago. I last saw her for initial consult a few months ago. GISELE at that time was normal. PVR was also low. She was started on post-coital prophylaxis with Keflex 500 mg PRN.      Today, she states that she has been doing well since our last visit. She has continued to take the  Keflex, though admits that she has taken this somewhat inconsistently. She sometimes has symptoms that suggest a UTI may be starting, but if she hydrates it will go away. She is unsure if the Keflex is something she would want to take long term as it is an antibiotic and she is a bit concerned about resistance.     Objective:     Exam:   Patient is a 27 year old female   No vital signs obtained due to virtual visit.  General Appearance: Well groomed, hygenic  Respiratory: No cough, no respiratory distress or labored breathing  Musculoskeletal: Grossly normal with no gross deficits  Skin: No discoloration or apparent rashes  Neurologic: No tremors  Psychiatric: Alert and oriented  Further examination is deferred due to the nature of our visit.

## 2023-02-03 NOTE — LETTER
Date:February 3, 2023      Provider requested that no letter be sent. Do not send.       [facility]

## 2023-02-03 NOTE — LETTER
2/3/2023       RE: Fabienne Clemente  [address]  [facility] [address]     Dear Colleague,    Thank you for referring your patient, Fabienne Clemente, to the [facility]OLOGY [facility]IN[facility] [place] at [facility]. Please see a copy of my visit note below.    Fabienne is a 27 year old who is being evaluated via a billable video visit.      How would you like to obtain your AVS? MyChart  If the video visit is dropped, the invitation should be resent by: Send to e-mail at: [email or web address]  Will anyone else be joining your video visit? No    Video-Visit Details    Type of service:  Video Visit   Video start time: 9:00 AM  Vide end time: 9:13 AM    Originating Location (pt. Location): Home  Distant Location (provider location):  Off-site  Platform used for Video Visit: William       Fabienne Clemente complains of   Chief Complaint   Patient presents with     Video Visit     3 month follow up        Assessment/Plan:  27 year old female with a history of recurrent UTIs, now on post-coital prophylaxis with Keflex 500 mg PRN. Has done well since our last visit, though sometimes has mild symptoms if she becomes dehydrated. Hesitant about staying on Keflex long-term due to concern for antibiotic resistance. However, will continue this for now. As she is hoping to conceive another child, we discussed that this is pregnancy category B and safe to continue if needed to prevent infections. We also discussed methenamine in brief as an alternative to antibiotics, which is pregnancy cat C.   -She will continue Keflex as needed. Refills sent today.   -Will obtain a UA/UC now to make sure there is no bacteria currently present, given her previous episode.   -She will follow up with me in one year, or sooner if needed.     Rebecca Honeycutt, CNP  Department of Urology      Subjective:   27 year old female with a history of recurrent UTIs, including asymptomatic bacteriuria at the time of  unfortunate miscarriage a few months ago. I last saw her for initial consult a few months ago. GISELE at that time was normal. PVR was also low. She was started on post-coital prophylaxis with Keflex 500 mg PRN.      Today, she states that she has been doing well since our last visit. She has continued to take the Keflex, though admits that she has taken this somewhat inconsistently. She sometimes has symptoms that suggest a UTI may be starting, but if she hydrates it will go away. She is unsure if the Keflex is something she would want to take long term as it is an antibiotic and she is a bit concerned about resistance.     Objective:     Exam:   Patient is a 27 year old female   No vital signs obtained due to virtual visit.  General Appearance: Well groomed, hygenic  Respiratory: No cough, no respiratory distress or labored breathing  Musculoskeletal: Grossly normal with no gross deficits  Skin: No discoloration or apparent rashes  Neurologic: No tremors  Psychiatric: Alert and oriented  Further examination is deferred due to the nature of our visit.                 Again, thank you for allowing me to participate in the care of your patient.      Sincerely,    Rebecca Honeycutt, CNP

## 2023-02-05 ENCOUNTER — HEALTH MAINTENANCE LETTER (OUTPATIENT)
Age: 28
End: 2023-02-05

## 2023-02-17 ENCOUNTER — LAB (OUTPATIENT)
Dept: LAB | Facility: CLINIC | Age: 28
End: 2023-02-17
Payer: COMMERCIAL

## 2023-02-17 DIAGNOSIS — N39.0 RECURRENT UTI: ICD-10-CM

## 2023-02-17 LAB
ALBUMIN UR-MCNC: NEGATIVE MG/DL
APPEARANCE UR: CLEAR
BACTERIA #/AREA URNS HPF: ABNORMAL /HPF
BILIRUB UR QL STRIP: NEGATIVE
COLOR UR AUTO: YELLOW
GLUCOSE UR STRIP-MCNC: NEGATIVE MG/DL
HGB UR QL STRIP: ABNORMAL
KETONES UR STRIP-MCNC: NEGATIVE MG/DL
LEUKOCYTE ESTERASE UR QL STRIP: NEGATIVE
NITRATE UR QL: NEGATIVE
PH UR STRIP: 6.5 [PH] (ref 5–7)
RBC #/AREA URNS AUTO: ABNORMAL /HPF
SP GR UR STRIP: 1.01 (ref 1–1.03)
SQUAMOUS #/AREA URNS AUTO: ABNORMAL /LPF
UROBILINOGEN UR STRIP-ACNC: 0.2 E.U./DL
WBC #/AREA URNS AUTO: ABNORMAL /HPF

## 2023-02-17 PROCEDURE — 87086 URINE CULTURE/COLONY COUNT: CPT

## 2023-02-17 PROCEDURE — 87186 SC STD MICRODIL/AGAR DIL: CPT

## 2023-02-17 PROCEDURE — 87088 URINE BACTERIA CULTURE: CPT

## 2023-02-17 PROCEDURE — 81001 URINALYSIS AUTO W/SCOPE: CPT

## 2023-02-19 LAB — BACTERIA UR CULT: ABNORMAL

## 2023-02-20 DIAGNOSIS — R82.71 BACTERIURIA: Primary | ICD-10-CM

## 2023-02-20 RX ORDER — NITROFURANTOIN MACROCRYSTALS 50 MG/1
50 CAPSULE ORAL DAILY
Qty: 30 CAPSULE | Refills: 11 | Status: ON HOLD | OUTPATIENT
Start: 2023-02-20 | End: 2023-11-09

## 2023-02-20 RX ORDER — NITROFURANTOIN 25; 75 MG/1; MG/1
100 CAPSULE ORAL 2 TIMES DAILY
Qty: 14 CAPSULE | Refills: 0 | Status: SHIPPED | OUTPATIENT
Start: 2023-02-20 | End: 2023-02-27

## 2023-03-08 NOTE — PATIENT INSTRUCTIONS
UROLOGY CLINIC VISIT PATIENT INSTRUCTIONS    -Continue the Keflex as needed with intercourse for UTI prevention. I was mistaken- this is actually pregnancy category B, which is even better/safer.   -Will collect a urine sample at the lab to run a urinalysis and culture to insure you don't have any infection now.   -Follow up with me in one year, or sooner if needed.      If you have any issues, questions or concerns in the meantime, do not hesitate to contact us at [phone] or via [employer].     Rebecca Honeycutt, CNP  Department of Urology    
03-08    137  |  104  |  7   ----------------------------<  78  3.8   |  24  |  0.41<L>    Ca    7.3<L>      08 Mar 2023 05:15  Phos  1.8     03-08  Mg     1.8     03-08    TPro  4.5<L>  /  Alb  1.4<L>  /  TBili  0.2  /  DBili  x   /  AST  14  /  ALT  12  /  AlkPhos  56  03-08  A1C with Estimated Average Glucose Result: 6.1 % (02-23-23 @ 06:01)

## 2023-03-24 ENCOUNTER — LAB REQUISITION (OUTPATIENT)
Dept: LAB | Facility: CLINIC | Age: 28
End: 2023-03-24
Payer: COMMERCIAL

## 2023-03-24 DIAGNOSIS — Z34.91 ENCOUNTER FOR SUPERVISION OF NORMAL PREGNANCY, UNSPECIFIED, FIRST TRIMESTER: ICD-10-CM

## 2023-03-24 LAB
BASOPHILS # BLD AUTO: 0 10E3/UL (ref 0–0.2)
BASOPHILS NFR BLD AUTO: 0 %
EOSINOPHIL # BLD AUTO: 0 10E3/UL (ref 0–0.7)
EOSINOPHIL NFR BLD AUTO: 0 %
ERYTHROCYTE [DISTWIDTH] IN BLOOD BY AUTOMATED COUNT: 13 % (ref 10–15)
HCT VFR BLD AUTO: 38.4 % (ref 35–47)
HGB BLD-MCNC: 12.6 G/DL (ref 11.7–15.7)
IMM GRANULOCYTES # BLD: 0 10E3/UL
IMM GRANULOCYTES NFR BLD: 0 %
LYMPHOCYTES # BLD AUTO: 1.9 10E3/UL (ref 0.8–5.3)
LYMPHOCYTES NFR BLD AUTO: 19 %
MCH RBC QN AUTO: 31.1 PG (ref 26.5–33)
MCHC RBC AUTO-ENTMCNC: 32.8 G/DL (ref 31.5–36.5)
MCV RBC AUTO: 95 FL (ref 78–100)
MONOCYTES # BLD AUTO: 0.6 10E3/UL (ref 0–1.3)
MONOCYTES NFR BLD AUTO: 6 %
NEUTROPHILS # BLD AUTO: 7.4 10E3/UL (ref 1.6–8.3)
NEUTROPHILS NFR BLD AUTO: 75 %
NRBC # BLD AUTO: 0 10E3/UL
NRBC BLD AUTO-RTO: 0 /100
PLATELET # BLD AUTO: 215 10E3/UL (ref 150–450)
RBC # BLD AUTO: 4.05 10E6/UL (ref 3.8–5.2)
WBC # BLD AUTO: 9.9 10E3/UL (ref 4–11)

## 2023-03-24 PROCEDURE — 86762 RUBELLA ANTIBODY: CPT | Mod: ORL | Performed by: PHYSICIAN ASSISTANT

## 2023-03-24 PROCEDURE — 86803 HEPATITIS C AB TEST: CPT | Mod: ORL | Performed by: PHYSICIAN ASSISTANT

## 2023-03-24 PROCEDURE — 87389 HIV-1 AG W/HIV-1&-2 AB AG IA: CPT | Mod: ORL | Performed by: PHYSICIAN ASSISTANT

## 2023-03-24 PROCEDURE — 85025 COMPLETE CBC W/AUTO DIFF WBC: CPT | Mod: ORL | Performed by: PHYSICIAN ASSISTANT

## 2023-03-24 PROCEDURE — 87340 HEPATITIS B SURFACE AG IA: CPT | Mod: ORL | Performed by: PHYSICIAN ASSISTANT

## 2023-03-24 PROCEDURE — 87086 URINE CULTURE/COLONY COUNT: CPT | Mod: ORL | Performed by: PHYSICIAN ASSISTANT

## 2023-03-24 PROCEDURE — 86780 TREPONEMA PALLIDUM: CPT | Mod: ORL | Performed by: PHYSICIAN ASSISTANT

## 2023-03-24 PROCEDURE — 86850 RBC ANTIBODY SCREEN: CPT | Mod: ORL | Performed by: PHYSICIAN ASSISTANT

## 2023-03-25 LAB
ABO/RH(D): NORMAL
ANTIBODY SCREEN: NEGATIVE
HCV AB SERPL QL IA: NONREACTIVE
SPECIMEN EXPIRATION DATE: NORMAL
T PALLIDUM AB SER QL: NONREACTIVE

## 2023-03-26 LAB — BACTERIA UR CULT: NORMAL

## 2023-03-27 LAB
HBV SURFACE AG SERPL QL IA: NONREACTIVE
HIV 1+2 AB+HIV1 P24 AG SERPL QL IA: NONREACTIVE
RUBV IGG SERPL QL IA: 1.63 INDEX
RUBV IGG SERPL QL IA: POSITIVE

## 2023-04-27 ENCOUNTER — LAB REQUISITION (OUTPATIENT)
Dept: LAB | Facility: CLINIC | Age: 28
End: 2023-04-27
Payer: COMMERCIAL

## 2023-04-27 DIAGNOSIS — L29.9 PRURITUS, UNSPECIFIED: ICD-10-CM

## 2023-04-27 DIAGNOSIS — Z34.91 ENCOUNTER FOR SUPERVISION OF NORMAL PREGNANCY, UNSPECIFIED, FIRST TRIMESTER: ICD-10-CM

## 2023-04-27 LAB
AST SERPL W P-5'-P-CCNC: 19 U/L (ref 10–35)
ERYTHROCYTE [DISTWIDTH] IN BLOOD BY AUTOMATED COUNT: 12.8 % (ref 10–15)
HCT VFR BLD AUTO: 38.3 % (ref 35–47)
HGB BLD-MCNC: 12.7 G/DL (ref 11.7–15.7)
MAGNESIUM SERPL-MCNC: 1.9 MG/DL (ref 1.7–2.3)
MCH RBC QN AUTO: 31.6 PG (ref 26.5–33)
MCHC RBC AUTO-ENTMCNC: 33.2 G/DL (ref 31.5–36.5)
MCV RBC AUTO: 95 FL (ref 78–100)
PLATELET # BLD AUTO: 190 10E3/UL (ref 150–450)
POTASSIUM SERPL-SCNC: 4 MMOL/L (ref 3.4–5.3)
RBC # BLD AUTO: 4.02 10E6/UL (ref 3.8–5.2)
WBC # BLD AUTO: 10.8 10E3/UL (ref 4–11)

## 2023-04-27 PROCEDURE — G0145 SCR C/V CYTO,THINLAYER,RESCR: HCPCS | Mod: ORL | Performed by: OBSTETRICS & GYNECOLOGY

## 2023-04-27 PROCEDURE — 84450 TRANSFERASE (AST) (SGOT): CPT | Mod: ORL | Performed by: OBSTETRICS & GYNECOLOGY

## 2023-04-27 PROCEDURE — 83735 ASSAY OF MAGNESIUM: CPT | Mod: ORL | Performed by: OBSTETRICS & GYNECOLOGY

## 2023-04-27 PROCEDURE — 87491 CHLMYD TRACH DNA AMP PROBE: CPT | Mod: ORL | Performed by: OBSTETRICS & GYNECOLOGY

## 2023-04-27 PROCEDURE — 85027 COMPLETE CBC AUTOMATED: CPT | Mod: ORL | Performed by: OBSTETRICS & GYNECOLOGY

## 2023-04-27 PROCEDURE — 87086 URINE CULTURE/COLONY COUNT: CPT | Mod: ORL | Performed by: OBSTETRICS & GYNECOLOGY

## 2023-04-27 PROCEDURE — 83789 MASS SPECTROMETRY QUAL/QUAN: CPT | Mod: ORL | Performed by: OBSTETRICS & GYNECOLOGY

## 2023-04-27 PROCEDURE — 84132 ASSAY OF SERUM POTASSIUM: CPT | Mod: ORL | Performed by: OBSTETRICS & GYNECOLOGY

## 2023-04-27 PROCEDURE — 87591 N.GONORRHOEAE DNA AMP PROB: CPT | Mod: ORL | Performed by: OBSTETRICS & GYNECOLOGY

## 2023-04-28 LAB
C TRACH DNA SPEC QL PROBE+SIG AMP: NEGATIVE
N GONORRHOEA DNA SPEC QL NAA+PROBE: NEGATIVE

## 2023-04-29 LAB — BACTERIA UR CULT: NORMAL

## 2023-05-01 LAB
BILE AC SERPL-SCNC: 1.2 UMOL/L
BKR LAB AP GYN ADEQUACY: NORMAL
BKR LAB AP GYN INTERPRETATION: NORMAL
BKR LAB AP HPV REFLEX: NORMAL
BKR LAB AP LMP: NORMAL
BKR LAB AP PREVIOUS ABNL DX: NORMAL
BKR LAB AP PREVIOUS ABNORMAL: NORMAL
CDCAE SERPL-SCNC: 0.3 UMOL/L
CHOLATE SERPL-SCNC: 0.3 UMOL/L
DO-CHOLATE SERPL-SCNC: 0.3 UMOL/L
PATH REPORT.COMMENTS IMP SPEC: NORMAL
PATH REPORT.COMMENTS IMP SPEC: NORMAL
PATH REPORT.RELEVANT HX SPEC: NORMAL
URSODEOXYCHOLATE SERPL-SCNC: 0.3 UMOL/L

## 2023-05-30 ENCOUNTER — LAB REQUISITION (OUTPATIENT)
Dept: LAB | Facility: CLINIC | Age: 28
End: 2023-05-30
Payer: COMMERCIAL

## 2023-05-30 DIAGNOSIS — Z36.9 ENCOUNTER FOR ANTENATAL SCREENING, UNSPECIFIED: ICD-10-CM

## 2023-05-30 DIAGNOSIS — R53.83 OTHER FATIGUE: ICD-10-CM

## 2023-05-30 DIAGNOSIS — L29.9 PRURITUS, UNSPECIFIED: ICD-10-CM

## 2023-05-30 LAB
ERYTHROCYTE [DISTWIDTH] IN BLOOD BY AUTOMATED COUNT: 12.9 % (ref 10–15)
HCT VFR BLD AUTO: 38.3 % (ref 35–47)
HGB BLD-MCNC: 12.7 G/DL (ref 11.7–15.7)
MCH RBC QN AUTO: 31.8 PG (ref 26.5–33)
MCHC RBC AUTO-ENTMCNC: 33.2 G/DL (ref 31.5–36.5)
MCV RBC AUTO: 96 FL (ref 78–100)
PLATELET # BLD AUTO: 193 10E3/UL (ref 150–450)
RBC # BLD AUTO: 4 10E6/UL (ref 3.8–5.2)
WBC # BLD AUTO: 11.4 10E3/UL (ref 4–11)

## 2023-05-30 PROCEDURE — 82239 BILE ACIDS TOTAL: CPT | Mod: ORL | Performed by: OBSTETRICS & GYNECOLOGY

## 2023-05-30 PROCEDURE — 82105 ALPHA-FETOPROTEIN SERUM: CPT | Mod: ORL | Performed by: OBSTETRICS & GYNECOLOGY

## 2023-05-30 PROCEDURE — 84443 ASSAY THYROID STIM HORMONE: CPT | Mod: ORL | Performed by: OBSTETRICS & GYNECOLOGY

## 2023-05-30 PROCEDURE — 85027 COMPLETE CBC AUTOMATED: CPT | Mod: ORL | Performed by: OBSTETRICS & GYNECOLOGY

## 2023-05-31 LAB — TSH SERPL DL<=0.005 MIU/L-ACNC: 0.81 UIU/ML (ref 0.3–4.2)

## 2023-06-01 LAB
# FETUSES US: NORMAL
AFP MOM SERPL: 0.65
AFP SERPL-MCNC: 24 NG/ML
AGE - REPORTED: 30.1 YR
BILE AC SERPL-SCNC: 3 UMOL/L
CURRENT SMOKER: NO
FAMILY MEMBER DISEASES HX: NO
GA METHOD: NORMAL
GA: NORMAL WK
IDDM PATIENT QL: NO
INTEGRATED SCN PATIENT-IMP: NORMAL
SPECIMEN DRAWN SERPL: NORMAL

## 2023-06-21 ENCOUNTER — LAB REQUISITION (OUTPATIENT)
Dept: LAB | Facility: CLINIC | Age: 28
End: 2023-06-21
Payer: COMMERCIAL

## 2023-06-21 DIAGNOSIS — Z87.440 PERSONAL HISTORY OF URINARY (TRACT) INFECTIONS: ICD-10-CM

## 2023-06-21 PROCEDURE — 87086 URINE CULTURE/COLONY COUNT: CPT | Mod: ORL | Performed by: PHYSICIAN ASSISTANT

## 2023-06-23 LAB — BACTERIA UR CULT: NORMAL

## 2023-08-16 ENCOUNTER — LAB REQUISITION (OUTPATIENT)
Dept: LAB | Facility: CLINIC | Age: 28
End: 2023-08-16
Payer: COMMERCIAL

## 2023-08-16 DIAGNOSIS — Z11.3 ENCOUNTER FOR SCREENING FOR INFECTIONS WITH A PREDOMINANTLY SEXUAL MODE OF TRANSMISSION: ICD-10-CM

## 2023-08-16 PROCEDURE — 86592 SYPHILIS TEST NON-TREP QUAL: CPT | Mod: ORL | Performed by: PHYSICIAN ASSISTANT

## 2023-08-17 LAB — RPR SER QL: NONREACTIVE

## 2023-09-01 ENCOUNTER — LAB REQUISITION (OUTPATIENT)
Dept: LAB | Facility: CLINIC | Age: 28
End: 2023-09-01
Payer: COMMERCIAL

## 2023-09-01 DIAGNOSIS — N39.0 URINARY TRACT INFECTION, SITE NOT SPECIFIED: ICD-10-CM

## 2023-09-01 PROCEDURE — 87086 URINE CULTURE/COLONY COUNT: CPT | Mod: ORL | Performed by: FAMILY MEDICINE

## 2023-09-03 LAB — BACTERIA UR CULT: NO GROWTH

## 2023-10-11 ENCOUNTER — LAB REQUISITION (OUTPATIENT)
Dept: LAB | Facility: CLINIC | Age: 28
End: 2023-10-11
Payer: COMMERCIAL

## 2023-10-11 DIAGNOSIS — Z34.80 ENCOUNTER FOR SUPERVISION OF OTHER NORMAL PREGNANCY, UNSPECIFIED TRIMESTER: ICD-10-CM

## 2023-10-11 PROCEDURE — 87653 STREP B DNA AMP PROBE: CPT | Mod: ORL | Performed by: FAMILY MEDICINE

## 2023-10-12 LAB — GP B STREP DNA SPEC QL NAA+PROBE: NEGATIVE

## 2023-11-08 ENCOUNTER — HOSPITAL ENCOUNTER (INPATIENT)
Facility: HOSPITAL | Age: 28
LOS: 1 days | Discharge: HOME | End: 2023-11-09
Attending: FAMILY MEDICINE | Admitting: FAMILY MEDICINE
Payer: COMMERCIAL

## 2023-11-08 DIAGNOSIS — Z37.9 VACUUM-ASSISTED VAGINAL DELIVERY: ICD-10-CM

## 2023-11-08 DIAGNOSIS — K64.5 THROMBOSED EXTERNAL HEMORRHOIDS: Primary | ICD-10-CM

## 2023-11-08 PROBLEM — Z33.1 PREGNANCY, INCIDENTAL: Status: ACTIVE | Noted: 2023-11-08

## 2023-11-08 PROBLEM — Z34.90 PREGNANCY: Status: ACTIVE | Noted: 2023-11-08

## 2023-11-08 LAB
ABO/RH(D): NORMAL
ANTIBODY SCREEN: NEGATIVE
HGB BLD-MCNC: 14.8 G/DL (ref 11.7–15.7)
SPECIMEN EXPIRATION DATE: NORMAL

## 2023-11-08 PROCEDURE — 250N000011 HC RX IP 250 OP 636: Mod: JZ | Performed by: FAMILY MEDICINE

## 2023-11-08 PROCEDURE — 10S0XZZ REPOSITION PRODUCTS OF CONCEPTION, EXTERNAL APPROACH: ICD-10-PCS | Performed by: FAMILY MEDICINE

## 2023-11-08 PROCEDURE — 86850 RBC ANTIBODY SCREEN: CPT | Performed by: FAMILY MEDICINE

## 2023-11-08 PROCEDURE — 86780 TREPONEMA PALLIDUM: CPT | Performed by: FAMILY MEDICINE

## 2023-11-08 PROCEDURE — 36415 COLL VENOUS BLD VENIPUNCTURE: CPT | Performed by: FAMILY MEDICINE

## 2023-11-08 PROCEDURE — 722N000001 HC LABOR CARE VAGINAL DELIVERY SINGLE

## 2023-11-08 PROCEDURE — 86901 BLOOD TYPING SEROLOGIC RH(D): CPT | Performed by: FAMILY MEDICINE

## 2023-11-08 PROCEDURE — 250N000013 HC RX MED GY IP 250 OP 250 PS 637: Performed by: FAMILY MEDICINE

## 2023-11-08 PROCEDURE — 258N000003 HC RX IP 258 OP 636: Performed by: FAMILY MEDICINE

## 2023-11-08 PROCEDURE — 120N000001 HC R&B MED SURG/OB

## 2023-11-08 PROCEDURE — 0KQM0ZZ REPAIR PERINEUM MUSCLE, OPEN APPROACH: ICD-10-PCS | Performed by: FAMILY MEDICINE

## 2023-11-08 PROCEDURE — 85018 HEMOGLOBIN: CPT | Performed by: FAMILY MEDICINE

## 2023-11-08 PROCEDURE — 250N000009 HC RX 250: Performed by: FAMILY MEDICINE

## 2023-11-08 RX ORDER — HYDROCORTISONE 25 MG/G
CREAM TOPICAL 3 TIMES DAILY PRN
Status: DISCONTINUED | OUTPATIENT
Start: 2023-11-08 | End: 2023-11-09 | Stop reason: HOSPADM

## 2023-11-08 RX ORDER — OXYTOCIN 10 [USP'U]/ML
10 INJECTION, SOLUTION INTRAMUSCULAR; INTRAVENOUS
Status: DISCONTINUED | OUTPATIENT
Start: 2023-11-08 | End: 2023-11-08 | Stop reason: HOSPADM

## 2023-11-08 RX ORDER — NALOXONE HYDROCHLORIDE 0.4 MG/ML
0.4 INJECTION, SOLUTION INTRAMUSCULAR; INTRAVENOUS; SUBCUTANEOUS
Status: DISCONTINUED | OUTPATIENT
Start: 2023-11-08 | End: 2023-11-08 | Stop reason: HOSPADM

## 2023-11-08 RX ORDER — MISOPROSTOL 200 UG/1
800 TABLET ORAL
Status: DISCONTINUED | OUTPATIENT
Start: 2023-11-08 | End: 2023-11-09 | Stop reason: HOSPADM

## 2023-11-08 RX ORDER — DOCUSATE SODIUM 100 MG/1
100 CAPSULE, LIQUID FILLED ORAL DAILY
Status: DISCONTINUED | OUTPATIENT
Start: 2023-11-08 | End: 2023-11-08

## 2023-11-08 RX ORDER — ONDANSETRON 4 MG/1
4 TABLET, ORALLY DISINTEGRATING ORAL EVERY 6 HOURS PRN
Status: DISCONTINUED | OUTPATIENT
Start: 2023-11-08 | End: 2023-11-08 | Stop reason: HOSPADM

## 2023-11-08 RX ORDER — DOCUSATE SODIUM 100 MG/1
200 CAPSULE, LIQUID FILLED ORAL 2 TIMES DAILY
Status: DISCONTINUED | OUTPATIENT
Start: 2023-11-08 | End: 2023-11-09 | Stop reason: HOSPADM

## 2023-11-08 RX ORDER — IBUPROFEN 800 MG/1
800 TABLET, FILM COATED ORAL EVERY 6 HOURS PRN
Status: DISCONTINUED | OUTPATIENT
Start: 2023-11-08 | End: 2023-11-09 | Stop reason: HOSPADM

## 2023-11-08 RX ORDER — PROCHLORPERAZINE MALEATE 10 MG
10 TABLET ORAL EVERY 6 HOURS PRN
Status: DISCONTINUED | OUTPATIENT
Start: 2023-11-08 | End: 2023-11-08 | Stop reason: HOSPADM

## 2023-11-08 RX ORDER — LIDOCAINE 40 MG/G
CREAM TOPICAL
Status: DISCONTINUED | OUTPATIENT
Start: 2023-11-08 | End: 2023-11-08 | Stop reason: HOSPADM

## 2023-11-08 RX ORDER — MISOPROSTOL 200 UG/1
400 TABLET ORAL
Status: DISCONTINUED | OUTPATIENT
Start: 2023-11-08 | End: 2023-11-08 | Stop reason: HOSPADM

## 2023-11-08 RX ORDER — MISOPROSTOL 200 UG/1
800 TABLET ORAL
Status: DISCONTINUED | OUTPATIENT
Start: 2023-11-08 | End: 2023-11-08 | Stop reason: HOSPADM

## 2023-11-08 RX ORDER — OXYTOCIN/0.9 % SODIUM CHLORIDE 30/500 ML
340 PLASTIC BAG, INJECTION (ML) INTRAVENOUS CONTINUOUS PRN
Status: DISCONTINUED | OUTPATIENT
Start: 2023-11-08 | End: 2023-11-08 | Stop reason: HOSPADM

## 2023-11-08 RX ORDER — KETOROLAC TROMETHAMINE 30 MG/ML
30 INJECTION, SOLUTION INTRAMUSCULAR; INTRAVENOUS
Status: DISCONTINUED | OUTPATIENT
Start: 2023-11-08 | End: 2023-11-09 | Stop reason: HOSPADM

## 2023-11-08 RX ORDER — CARBOPROST TROMETHAMINE 250 UG/ML
250 INJECTION, SOLUTION INTRAMUSCULAR
Status: DISCONTINUED | OUTPATIENT
Start: 2023-11-08 | End: 2023-11-09 | Stop reason: HOSPADM

## 2023-11-08 RX ORDER — METHYLERGONOVINE MALEATE 0.2 MG/ML
200 INJECTION INTRAVENOUS
Status: DISCONTINUED | OUTPATIENT
Start: 2023-11-08 | End: 2023-11-08 | Stop reason: HOSPADM

## 2023-11-08 RX ORDER — CITRIC ACID/SODIUM CITRATE 334-500MG
30 SOLUTION, ORAL ORAL
Status: DISCONTINUED | OUTPATIENT
Start: 2023-11-08 | End: 2023-11-08 | Stop reason: HOSPADM

## 2023-11-08 RX ORDER — BISACODYL 10 MG
10 SUPPOSITORY, RECTAL RECTAL DAILY PRN
Status: DISCONTINUED | OUTPATIENT
Start: 2023-11-08 | End: 2023-11-09 | Stop reason: HOSPADM

## 2023-11-08 RX ORDER — ONDANSETRON 2 MG/ML
4 INJECTION INTRAMUSCULAR; INTRAVENOUS EVERY 6 HOURS PRN
Status: DISCONTINUED | OUTPATIENT
Start: 2023-11-08 | End: 2023-11-08 | Stop reason: HOSPADM

## 2023-11-08 RX ORDER — MISOPROSTOL 200 UG/1
400 TABLET ORAL
Status: DISCONTINUED | OUTPATIENT
Start: 2023-11-08 | End: 2023-11-09 | Stop reason: HOSPADM

## 2023-11-08 RX ORDER — IBUPROFEN 800 MG/1
800 TABLET, FILM COATED ORAL
Status: DISCONTINUED | OUTPATIENT
Start: 2023-11-08 | End: 2023-11-09 | Stop reason: HOSPADM

## 2023-11-08 RX ORDER — PROCHLORPERAZINE 25 MG
25 SUPPOSITORY, RECTAL RECTAL EVERY 12 HOURS PRN
Status: DISCONTINUED | OUTPATIENT
Start: 2023-11-08 | End: 2023-11-08 | Stop reason: HOSPADM

## 2023-11-08 RX ORDER — OXYTOCIN 10 [USP'U]/ML
10 INJECTION, SOLUTION INTRAMUSCULAR; INTRAVENOUS
Status: DISCONTINUED | OUTPATIENT
Start: 2023-11-08 | End: 2023-11-09 | Stop reason: HOSPADM

## 2023-11-08 RX ORDER — OXYTOCIN 10 [USP'U]/ML
10 INJECTION, SOLUTION INTRAMUSCULAR; INTRAVENOUS
Status: COMPLETED | OUTPATIENT
Start: 2023-11-08 | End: 2023-11-08

## 2023-11-08 RX ORDER — ACETAMINOPHEN 325 MG/1
650 TABLET ORAL EVERY 4 HOURS PRN
Status: DISCONTINUED | OUTPATIENT
Start: 2023-11-08 | End: 2023-11-09 | Stop reason: HOSPADM

## 2023-11-08 RX ORDER — METOCLOPRAMIDE 10 MG/1
10 TABLET ORAL EVERY 6 HOURS PRN
Status: DISCONTINUED | OUTPATIENT
Start: 2023-11-08 | End: 2023-11-08 | Stop reason: HOSPADM

## 2023-11-08 RX ORDER — NALOXONE HYDROCHLORIDE 0.4 MG/ML
0.2 INJECTION, SOLUTION INTRAMUSCULAR; INTRAVENOUS; SUBCUTANEOUS
Status: DISCONTINUED | OUTPATIENT
Start: 2023-11-08 | End: 2023-11-08 | Stop reason: HOSPADM

## 2023-11-08 RX ORDER — OXYTOCIN/0.9 % SODIUM CHLORIDE 30/500 ML
100-340 PLASTIC BAG, INJECTION (ML) INTRAVENOUS CONTINUOUS PRN
Status: DISCONTINUED | OUTPATIENT
Start: 2023-11-08 | End: 2023-11-09 | Stop reason: HOSPADM

## 2023-11-08 RX ORDER — OXYTOCIN/0.9 % SODIUM CHLORIDE 30/500 ML
340 PLASTIC BAG, INJECTION (ML) INTRAVENOUS CONTINUOUS PRN
Status: DISCONTINUED | OUTPATIENT
Start: 2023-11-08 | End: 2023-11-09 | Stop reason: HOSPADM

## 2023-11-08 RX ORDER — CARBOPROST TROMETHAMINE 250 UG/ML
250 INJECTION, SOLUTION INTRAMUSCULAR
Status: DISCONTINUED | OUTPATIENT
Start: 2023-11-08 | End: 2023-11-08 | Stop reason: HOSPADM

## 2023-11-08 RX ORDER — MODIFIED LANOLIN
OINTMENT (GRAM) TOPICAL
Status: DISCONTINUED | OUTPATIENT
Start: 2023-11-08 | End: 2023-11-09 | Stop reason: HOSPADM

## 2023-11-08 RX ORDER — METOCLOPRAMIDE HYDROCHLORIDE 5 MG/ML
10 INJECTION INTRAMUSCULAR; INTRAVENOUS EVERY 6 HOURS PRN
Status: DISCONTINUED | OUTPATIENT
Start: 2023-11-08 | End: 2023-11-08 | Stop reason: HOSPADM

## 2023-11-08 RX ORDER — METHYLERGONOVINE MALEATE 0.2 MG/ML
200 INJECTION INTRAVENOUS
Status: DISCONTINUED | OUTPATIENT
Start: 2023-11-08 | End: 2023-11-09 | Stop reason: HOSPADM

## 2023-11-08 RX ADMIN — ACETAMINOPHEN 650 MG: 325 TABLET ORAL at 18:07

## 2023-11-08 RX ADMIN — DOCUSATE SODIUM 200 MG: 100 CAPSULE, LIQUID FILLED ORAL at 18:07

## 2023-11-08 RX ADMIN — Medication 340 ML/HR: at 15:41

## 2023-11-08 RX ADMIN — OXYTOCIN 10 UNITS: 10 INJECTION INTRAVENOUS at 15:37

## 2023-11-08 RX ADMIN — KETOROLAC TROMETHAMINE 30 MG: 30 INJECTION, SOLUTION INTRAMUSCULAR at 16:02

## 2023-11-08 RX ADMIN — ACETAMINOPHEN 650 MG: 325 TABLET ORAL at 22:23

## 2023-11-08 RX ADMIN — BENZOCAINE AND LEVOMENTHOL: 200; 5 SPRAY TOPICAL at 18:07

## 2023-11-08 RX ADMIN — WITCH HAZEL: 500 SOLUTION RECTAL; TOPICAL at 18:07

## 2023-11-08 RX ADMIN — IBUPROFEN 800 MG: 800 TABLET ORAL at 22:22

## 2023-11-08 RX ADMIN — SODIUM CHLORIDE, POTASSIUM CHLORIDE, SODIUM LACTATE AND CALCIUM CHLORIDE 500 ML: 600; 310; 30; 20 INJECTION, SOLUTION INTRAVENOUS at 15:04

## 2023-11-08 ASSESSMENT — ACTIVITIES OF DAILY LIVING (ADL)
ADLS_ACUITY_SCORE: 35
ADLS_ACUITY_SCORE: 18

## 2023-11-08 NOTE — H&P
Maternal Admission H&P  [facility]  Date of Admission: 2023  Date of Service: 2023    Name      Fabienne Clemente         1995  MRN       [id number]  PCP        No Ref-Primary, Physician         Assessment and Plan  28 year old  at 40w2d in active labor, requests TOLAC; consult with [facility]  Anticipate .  IV access, continuous monitoring, labwork per protocol for TOLAC  Analgesia per patient's wishes  Group B strep: negative  O pos blood type      Chief Complaint  contractions    HPI  Fabienne Clemente is a 28 year old woman at 40w2d, multip, based on an Estimated Date of Delivery: 2023. She presents with regular contractions, leaking clear fluid over the last 30 to 40 minutes.  Requests TOLAC.  8cm per nursing staff.  GBS neg. O pos blood type.  [employer] consult for TOLAC.  Had a marginal cord insertion that resolved following a follow up ultrasound.  No other concerns at this time.      Patient Active Problem List    Diagnosis Date Noted    Pregnancy 2023     Priority: Medium    Pregnancy, incidental 2023     Priority: Medium    Maternal serum screen positive for trisomy 21 2022     Priority: Medium     10-21-22 NIPS positive for Trisomy 21      History of  delivery 10/23/2022     Priority: Medium     Desires TOLAC   -OP report requested from ____ (location of previous section and date)   - calculator____% success rate  -Physician referral placed (22-24 weeks) ___ (date and physician group)   -OP report obtained and location in the chart ___   -Physician consult ____ (date) and physician note location ___   Consent signed (by 37 weeks) ___ (date)   -Patient informed of: admission labs (hgb and T&S and continuous fetal monitoring ___      Supervision of high risk pregnancy in first trimester 10/23/2022     Priority: Medium     Clinic/Hospital:   Partner Name:   Ultrasound predicts sex:  Childrens names/ages:    Previous labor experiences:  Waterbirth (interest, declined, ineligible):  Date waterbirth patient education reviewed:  Level of education/occupation:   Pertinent History:   PP contraception:  Hx PPD/Depression/Anxiety:  Peds provider:    Plans for labor pain management:   Plans for post partum recovery/time off:  Feeding preference:   Breast pump:   Car seat:  Circumcision:  Discussed 2 week visit:  Tdap:   Flu:  COVID:       ?                BMI 24.0-24.9, adult 10/23/2022     Priority: Medium     Total weight gain recommended in pregnancy: 25 to 35 pounds      Recurrent urinary tract infection 10/23/2022     Priority: Medium     Referral to urology generated      Macrosomic baby 10/23/2022     Priority: Medium     First born daughter weighed 9 pounds 3 ounces      Genetic screening 10/23/2022     Priority: Medium     10/21/2022: Noninvasive prenatal testing  Offer single marker AFP:________      Acute cystitis without hematuria 10/23/2022     Priority: Medium     10/22/2022: Macrobid 100 mg p.o. twice daily x7 days, #14      Missed  10/22/2022     Priority: Medium      OB History    Para Term  AB Living   3 1 1 0 1 1   SAB IAB Ectopic Multiple Live Births   1 0 0 0 1      # Outcome Date GA Lbr Jerzy/2nd Weight Sex Delivery Anes PTL Lv   3 Current            2 SAB 10/2022 8w3d    AB, MISSED      1 Term 21 40w5d  4.167 kg (9 lb 3 oz) F CS-LTranv  N CLAUDIO      Birth Comments: fetal intolerance at 7 cm      Name: Bridget      Apgar1: 8  Apgar5: 9       Review of Systems   Negative except what is noted in HPI.     Past Medical History  Past Medical History:   Diagnosis Date    Recurrent urinary tract infection         Past Surgical History  Past Surgical History:   Procedure Laterality Date     SECTION         Allergies  Patient has no known allergies.    Family History  Family History   Problem Relation Age of Onset    No Known Problems Mother     No Known Problems Father     Bipolar  Disorder Maternal Grandfather     Lung Cancer Paternal Grandmother     Colon Cancer Paternal Grandfather     Hypertension Paternal Grandfather     No Known Problems Sister     No Known Problems Daughter     No Known Problems Paternal Half-Brother     No Known Problems Paternal Half-Sister     No Known Problems Paternal Half-Sister        Social History  I have reviewed this patient's social history and updated it with pertinent information if needed. Fabienne Clemente  reports that she has never smoked. She has never used smokeless tobacco. She reports that she does not currently use alcohol. She reports that she does not use drugs.    Prior to Admission Medication List  Medications Prior to Admission   Medication Sig Dispense Refill Last Dose    Biotin 10 MG CAPS        fish oil-omega-3 fatty acids 1000 MG capsule Take 2 g by mouth daily       multivitamin w/minerals (THERA-VIT-M) tablet Take 1 tablet by mouth daily       nitroFURantoin macrocrystal (MACRODANTIN) 50 MG capsule Take 1 capsule (50 mg) by mouth daily After completing 7-day treatment course 30 capsule 11     Probiotic Product (PROBIOTIC-10 PO)            Allergies  No Known Allergies  Immunization History   Administered Date(s) Administered    COVID-19 Monovalent 18+ (Moderna) 04/14/2021, 05/12/2021, 11/28/2021        Physical Exam  There were no vitals taken for this visit.  HEART: RRR, no murmur  LUNGS: CTA bilaterally  Fetal Heart Tones: moderate variability, baseline 110, occasional decels- possible variables  CONTRACTIONS:  frequency q 3 minutes  CERVIX: 8 per nursing  FLUID: Clear.  Fetal Presentation: vertex.    Labs  Group B Strep PCR   Date Value Ref Range Status   10/11/2023 Negative Negative Final     Comment:     Presumed negative for Streptococcus agalactiae (Group B Streptococcus) or the number of organisms may be below the limit of detection of the assay.      Antibody Screen   Date Value Ref Range Status   11/08/2023 Negative Negative Final      Hepatitis B Surface Antigen   Date Value Ref Range Status   03/24/2023 Nonreactive Nonreactive Final     Chlamydia trachomatis   Date Value Ref Range Status   10/21/2022 Negative Negative Final     Comment:     A negative result by transcription mediated amplification does not preclude the presence of C. trachomatis infection because results are dependent on proper and adequate collection, absence of inhibitors and sufficient rRNA to be detected.     Neisseria gonorrhoeae   Date Value Ref Range Status   10/21/2022 Negative Negative Final     Comment:     Negative for N. gonorrhoeae rRNA by transcription mediated amplification. A negative result by transcription mediated amplification does not preclude the presence of C. trachomatis infection because results are dependent on proper and adequate collection, absence of inhibitors and sufficient rRNA to be detected.     Hemoglobin   Date Value Ref Range Status   11/08/2023 14.8 11.7 - 15.7 g/dL Final      Admission on 11/08/2023   Component Date Value Ref Range Status    Hemoglobin 11/08/2023 14.8  11.7 - 15.7 g/dL Final    ABO/RH(D) 11/08/2023 O POS   Final    Antibody Screen 11/08/2023 Negative  Negative Final    SPECIMEN EXPIRATION DATE 11/08/2023 [id number]   Final        Completed by:   Eliu Acevedo MD  [facility]  11/8/2023 4:24 PM

## 2023-11-08 NOTE — PROGRESS NOTES
Dr. Acevedo at bedside and aware of patient's two elevated BP's. Plan per provider is to recheck patient BP's and update back after 30 mins.

## 2023-11-08 NOTE — L&D DELIVERY NOTE
[facility] Delivery Summary  [facility] Maternity Care  Date of Service: 2023    Name      Fabienne Clemente         1995  MRN       [id number]  PCP        Eliu Acevedo MD    DELIVERY NARRATIVE    On 2023 Fabienne Clemente delivered a viable female infant at 40w2d with apgars of 7 and 8 via , Vacuum Delivery.    Fabienne presented to Maternity Care on 2023 with regular contractions and leaking clear fluid over the last hour or so. Her group B Strep (GBS) carrier status was negative.  O pos blood type.    During initial labor it was noted that baby's baseline heartbeat ranged from 105 to 120 with moderate variability.  There were occasional decels and no accels.  A fetal scalp electrode was placed showing similar heart tracing changes.  The in house obstetrician was notified of the category 2 tracing.  Position changes were frequently implemented and IV fluids were started.      When baby's heartbeat was consistently in the 100 bpm range baseline for multiple minutes with moderate variability it was decided to start pushing and hold the anterior lip out of the way.  Fabienne was a very effective pusher.  After persistent bradycardia and the anterior lip resolved, the patient was requested to to consider vacuum delivery and consented.  There were 3 total pulls for a total of 30 seconds.  1 pop off.  The third pull resulted in a vaginal delivery.  There was noted to be a shoulder dystocia that was identified and this resolved with yonatan and suprapubic pressure.   The NICU team was present as there was vacuum delivery and assessed the baby after delivery.        Placenta delivered at 2023  3:40 PM . Placental disposition was Hospital disposal . Fundal massage performed and fundus found to be  firm. The following uterotonics were given: Pitocin (IM). Perineum, vagina, cervix were inspected, and the following lacerations were noted: 2nd degree perineal. Lacerations were  repaired in the usual fashion using 3-0 Vicryl. QBL: 300 mL.    Excellent hemostasis was noted. Needle and sponge count correct. Infant and patient in delivery room in good and stable condition.   _________________    GA: 40w2d  GP:   Labor Complications: Fetal Intolerance   Additional Complications:    QBL: 300 mL  Delivery Type: , Vacuum   Duration of Ruptured Membranes: rupture date, rupture time, delivery date, or delivery time have not been documented  GBS Status:   Group B Strep PCR   Date Value Ref Range Status   10/11/2023 Negative Negative Final     Comment:     Presumed negative for Streptococcus agalactiae (Group B Streptococcus) or the number of organisms may be below the limit of detection of the assay.       Weight: 4.3 kg (9 lb 7.7 oz)   Apgar scores: 7 , 8         May, Female-Fabienne [[id number]]      Labor Events     labor?: No   steroids: None  Labor Type: TOLAC (Trial of Labor After )  Predominate monitoring during 1st stage: continuous electronic fetal monitoring     Antibiotics received during labor?: No     Rupture identifier: Sac 1  Rupture date/time:     Rupture type: Spontaneous Rupture of Membranes     Augmentation: None       Delivery/Placenta Date and Time      Delivery Date: 23 Delivery Time:  3:35 PM   Placenta Date/Time: 2023  3:40 PM  Oxytocin given at the time of delivery: after delivery of placenta  Delivering clinician: Eliu Acevedo MD   Other personnel present at delivery:  Provider Role   Kyra Denton RN Wyffels, Olivia, RN Thurber, Lindsey M RN              Vaginal Counts       Initial count performed by 2 team members:  Two Team Members   Curtis Denton         [place] Suture Needles Sponges (RETIRED) Instruments   Initial counts 0 0 5    Added to count 1 1 0    Relief counts       Final counts 1 1 5            Placed during labor Accounted for at the end of labor   FSE Yes Yes   IUPC No NA   Cervidil No NA                   Final count performed by 2 team members:  Two Team Members   Corrie Acevedo      Final count correct?: Yes      Pre-Birth Team Brief: Complete  Post-Birth Team Debrief: Complete       Apgars    Living status: Living   1 Minute 5 Minute 10 Minute 15 Minute 20 Minute   Skin color: 0  1       Heart rate: 2  2       Reflex irritability: 2  2       Muscle tone: 2  2       Respiratory effort: 1  1       Total: 7  8       Apgars assigned by: JOJO KIRBY PA-C       Cord      Vessels: 3 Vessels    Cord Complications: None               Cord Blood Disposition: Lab    Gases Sent?: Yes    Delayed cord clamping?: Yes    Cord Clamping Delay (seconds): 31-60 seconds            Stem cell collection?: No            Resuscitation    Methods: Oxygen, NCPAP, Oximetry       [place] Care at Delivery: Asked by Dr. Acevedo and Dr. Johnson to attend the delivery of this term, female infant with a gestational age of 40 2/7 weeks secondary to category II fetal heart tracing, vacuum-assisted delivery (pop-off x1), and shoulder dystocia.      Infant was born via vaginal delivery with vacuum-assist device on 2023 at 15:25 hours. Approximately thirty seconds of delayed cord clamping were completed in which the infant was stimulated and dried. She had adequate tone, but very intermittent cries without spontaneous respirations; thus, decision made to clamp and cut the cord. She was placed on a pre-heated warmer and further dried, stimulated, and bulb suctioned of the mouth and nose. Upon auscultation, she sounded coarse over bilateral lung fields. Delee suctioned of the mouth and nose for ~4 mL of clear secretions. At about 3:30 of life, infant with grunting and skin pale. Decision to initiate CPAP PEEP 5+ FiO2 21% at four minutes of life. Pulse oximetry placed on infant's right wrist revealed SpO2 in the low 80s, that quickly rita to >92% after starting CPAP. CPAP continued for five minutes and discontinued at nine  minutes of life. At that time, infant's skin was pink, spontaneous respirations with loud lusty cry. SpO2 remained >92% for several minutes after discontinuing CPAP. Gross PE is WNL except for mild edema over the right parieto-occipital skull; though there is no bogginess or fluid wave. Clavicles and humerus bones appear in tact without crepitus.  Infant required no further resuscitation.  Infant was shown to mother and father, handoff to nursery nurse and will be remain in the NBN in [facility] for further care.    Laina Miller PA-C 2023 3:59 PM    Output in Delivery Room: Stool       [place] Measurements      Weight: 9 lb 7.7 oz    Output in delivery room: Stool       Skin to Skin and Feeding Plan      Skin to skin initiation date/time: 1841    Skin to skin with: Mother  Skin to skin end date/time:            Labor Events and Shoulder Dystocia    Fetal Tracing Prior to Delivery: Category 2, Category 1  Fetal Tracing Comments: moderate variability, variable decels, fetal bradycardia  Shoulder dystocia present?: Pos   Time body delivered: 1535 CST   Time head delivered: 1534 CST Help called by: xiomy bernard   Time recognized: 2023 1535    Time help called: 2023 1535        NICU arrived: 2023 1530    Gentle attempt at traction, assisted by maternal expulsive forces?: Yes           First Maneuver: Mikel maneuver, Suprapubic pressure    Time performed: 2023 1534               Delivery (Maternal) (Provider to Complete) (326420)    Episiotomy: None      Perineal lacerations: 2nd    Repair suture: 3-0 Vicryl  Number of repair packets: 1  Genital tract inspection done: Pos       Blood Loss  Mother: Fabienne Clemente #[id number]     Start of Mother's Information      Delivery Blood Loss  23 0335 - 23 1624      Delivery QBL (mL) Hospital Encounter 300 mL    Total  300 mL               End of Mother's Information  Mother: Fabienne Clemente #[id number]                Delivery - Provider to  Complete (236720)    Delivering clinician: Eliu Acevedo MD  Delivery Type (Choose the 1 that will go to the Birth History): , Vacuum    Verbal Informed Consent Obtained For Vacuum: Yes Alternate Labor Strategies Discussed (vacuum): Yes      Emergency Resources Available (vacuum): Charge Nurse/Team, Resuscitation Team     Type (vacuum): kiwi Accrued Pulling Time (vacuum): 30 seconds        # of Pop-Offs (vacuum): 1 # of Pulls/Contractions (vacuum): 3   Position (vacuum): OA Fetal Station (vacuum): +2        Indication for Operative Vaginal Delivery (vacuum): Fetal Status     Operative Vaginal Delivery Brief Note Vacuum: There was fetal bradycardia present with decels and moderate variabilty.  Vacuum discussed briefly with patient and  and they consented.  NICU and extra staff requested to the room.  Mom was an incredibly effective at pushing.  The vacuum was applied and one pull with the first contraction.  There was a pop-off with the second pull; the vacuum was reapplied and the third pull resulted in delivery.  It was noted that the anterior shoulder was not easily delivered and that there was a turtle sign.  Mikel and suprapubic pressure resolved the shoulder dystocia and the baby was easily delivered thereafter.  Baby was on mom's chest for 10 seconds but due to grunting and a higher heart rate, baby was evaluated and treated at the warmer for a short period of time- and then returned to mom thereafter.  Cord gases ordered.                  Other personnel:  Provider Role   Kyra Denton, Janette Kelly RN Thurber, Lindsey M, RN                     Placenta    Date/Time: 2023  3:40 PM  Removal: Spontaneous  Disposition: Hospital disposal             Anesthesia    Method: Local                    Presentation and Position    Presentation: Vertex    Position: Left Occiput Anterior                     Completed by:   Eliu Acevedo MD  [facility]  Medicine  11/8/2023 4:24 PM

## 2023-11-08 NOTE — PROGRESS NOTES
Dr. Acevedo notified of patient;s elevated BP. Orders received for CBC labs. Plan per provider is to update with any further elevated BP's.

## 2023-11-09 VITALS
RESPIRATION RATE: 16 BRPM | SYSTOLIC BLOOD PRESSURE: 110 MMHG | OXYGEN SATURATION: 99 % | HEART RATE: 78 BPM | TEMPERATURE: 98.5 F | DIASTOLIC BLOOD PRESSURE: 65 MMHG

## 2023-11-09 PROBLEM — O09.91 SUPERVISION OF HIGH RISK PREGNANCY IN FIRST TRIMESTER: Status: RESOLVED | Noted: 2022-10-23 | Resolved: 2023-11-09

## 2023-11-09 PROBLEM — N30.00 ACUTE CYSTITIS WITHOUT HEMATURIA: Status: RESOLVED | Noted: 2022-10-23 | Resolved: 2023-11-09

## 2023-11-09 PROBLEM — Z37.9 VACUUM-ASSISTED VAGINAL DELIVERY: Status: ACTIVE | Noted: 2023-11-09

## 2023-11-09 PROBLEM — Z13.79 GENETIC SCREENING: Status: RESOLVED | Noted: 2022-10-23 | Resolved: 2023-11-09

## 2023-11-09 PROBLEM — Z34.90 PREGNANCY: Status: RESOLVED | Noted: 2023-11-08 | Resolved: 2023-11-09

## 2023-11-09 PROBLEM — O02.1 MISSED ABORTION: Status: RESOLVED | Noted: 2022-10-22 | Resolved: 2023-11-09

## 2023-11-09 PROBLEM — N39.0 RECURRENT URINARY TRACT INFECTION: Status: RESOLVED | Noted: 2022-10-23 | Resolved: 2023-11-09

## 2023-11-09 PROBLEM — Z33.1 PREGNANCY, INCIDENTAL: Status: RESOLVED | Noted: 2023-11-08 | Resolved: 2023-11-09

## 2023-11-09 LAB — T PALLIDUM AB SER QL: NONREACTIVE

## 2023-11-09 PROCEDURE — 250N000013 HC RX MED GY IP 250 OP 250 PS 637: Performed by: FAMILY MEDICINE

## 2023-11-09 PROCEDURE — 250N000009 HC RX 250: Performed by: FAMILY MEDICINE

## 2023-11-09 RX ORDER — HYDROCORTISONE 25 MG/G
CREAM TOPICAL 3 TIMES DAILY PRN
Qty: 30 G | Refills: 0 | Status: SHIPPED | OUTPATIENT
Start: 2023-11-09

## 2023-11-09 RX ORDER — DOCUSATE SODIUM 100 MG/1
200 CAPSULE, LIQUID FILLED ORAL 2 TIMES DAILY
Qty: 10 CAPSULE | Refills: 0 | Status: SHIPPED | OUTPATIENT
Start: 2023-11-09

## 2023-11-09 RX ADMIN — ACETAMINOPHEN 650 MG: 325 TABLET ORAL at 08:42

## 2023-11-09 RX ADMIN — DOCUSATE SODIUM 200 MG: 100 CAPSULE, LIQUID FILLED ORAL at 08:42

## 2023-11-09 RX ADMIN — ACETAMINOPHEN 650 MG: 325 TABLET ORAL at 13:45

## 2023-11-09 RX ADMIN — WITCH HAZEL: 500 SOLUTION RECTAL; TOPICAL at 00:49

## 2023-11-09 RX ADMIN — BENZOCAINE AND LEVOMENTHOL: 200; 5 SPRAY TOPICAL at 00:49

## 2023-11-09 RX ADMIN — HYDROCORTISONE 2.5%: 25 CREAM TOPICAL at 01:11

## 2023-11-09 RX ADMIN — EPSOM SALT: 1 GRANULE ORAL; TOPICAL at 01:12

## 2023-11-09 RX ADMIN — BENZOCAINE AND LEVOMENTHOL: 200; 5 SPRAY TOPICAL at 08:44

## 2023-11-09 RX ADMIN — WITCH HAZEL: 500 SOLUTION RECTAL; TOPICAL at 08:44

## 2023-11-09 RX ADMIN — IBUPROFEN 800 MG: 800 TABLET ORAL at 11:58

## 2023-11-09 RX ADMIN — ACETAMINOPHEN 650 MG: 325 TABLET ORAL at 03:03

## 2023-11-09 RX ADMIN — IBUPROFEN 800 MG: 800 TABLET ORAL at 06:32

## 2023-11-09 ASSESSMENT — ACTIVITIES OF DAILY LIVING (ADL)
ADLS_ACUITY_SCORE: 18

## 2023-11-09 NOTE — PROGRESS NOTES
Maternal Postpartum Progress Note  [facility] Maternity Care  Date of Service: 2023    Name      Fabienne Clemente         1995  MRN       [id number]  PCP        No Ref-Primary, Physician        Subjective:  The patient feels well:  Voiding without difficulty, lochia normal, tolerating normal diet, and passing flatus.  Pain is well controlled with current medications.  The patient has no emotional concerns.  No calf pain or swelling.  The baby is well and being fed by breast.  She has concerns with ongoing large hemorrhoids.      Objective:  /64 (BP Location: Left arm)   Pulse 82   Temp 98.1  F (36.7  C) (Oral)   Resp 18   SpO2 96%   Breastfeeding Unknown   Lochia is minimal.  The uterine fundus is firm at umbilicus.  Urinary output is adequate. No calf tenderness.  No edema.    Labs:  Hemoglobin   Date Value Ref Range Status   2023 14.8 11.7 - 15.7 g/dL Final       Assessment:    -Postpartum Day 1 s/p vaginal delivery  -Normal postpartum course.    -hemorrhoids    Plan:    -Continue current care.  -Plan discharge today after 24 hours.  -breastfeeding support  -will place colorectal consult for outpatient follow up per pt request.    Completed by:   Fabienne Flores MD, M.D.  [facility]  2023 10:08 AM

## 2023-11-09 NOTE — DISCHARGE INSTRUCTIONS
Warning Signs after Having a Baby    Keep this paper on your fridge or somewhere else where you can see it.    Call your provider if you have any of these symptoms up to 12 weeks after having your baby.    Thoughts of hurting yourself or your baby  Pain in your chest or trouble breathing  Severe headache not helped by pain medicine  Eyesight concerns (blurry vision, seeing spots or flashes of light, other changes to eyesight)  Fainting, shaking or other signs of a seizure    Call 9-1-1 if you feel that it is an emergency.     The symptoms below can happen to anyone after giving birth. They can be very serious. Call your provider if you have any of these warning signs.    My provider s phone number: _______________________    Losing too much blood (hemorrhage)    Call your provider if you soak through a pad in less than an hour or pass blood clots bigger than a golf ball. These may be signs that you are bleeding too much.    Blood clots in the legs or lungs    After you give birth, your body naturally clots its blood to help prevent blood loss. Sometimes this increased clotting can happen in other areas of the body, like the legs or lungs. This can block your blood flow and be very dangerous.     Call your provider if you:  Have a red, swollen spot on the back of your leg that is warm or painful when you touch it.   Are coughing up blood.     Infection    Call your provider if you have any of these symptoms:  Fever of 100.4 F (38 C) or higher.  Pain or redness around your stitches if you had an incision.   Any yellow, white, or green fluid coming from places where you had stitches or surgery.    Mood Problems (postpartum depression)    Many people feel sad or have mood changes after having a baby. But for some people, these mood swings are worse.     Call your provider right away if you feel so anxious or nervous that you can't care for yourself or your baby.    Preeclampsia (high blood pressure)    Even if you  didn't have high blood pressure when you were pregnant, you are at risk for the high blood pressure disease called preeclampsia. This risk can last up to 12 weeks after giving birth.     Call your provider if you have:   Pain on your right side under your rib cage  Sudden swelling in the hands and face    Remember: You know your body. If something doesn't feel right, get medical help.     For informational purposes only. Not to replace the advice of your health care provider. Copyright 2020 [facility]. All rights reserved. Clinically reviewed by Reyna Graves, RNC-OB, MSN. [employer] [id number] - Rev 02/23.

## 2023-11-09 NOTE — PLAN OF CARE
Problem: Postpartum (Vaginal Delivery)  Goal: Optimal Pain Control and Function  Outcome: Progressing  Intervention: Prevent or Manage Pain  Recent Flowsheet Documentation  Taken 11/9/2023 1010 by Brandee Blanca RN  Perineal Care:   perineum cleansed   absorbent brief/pad changed   Goal Outcome Evaluation:      Plan of Care Reviewed With: patient    Overall Patient Progress: improvingOverall Patient Progress: improving     Patient is mostly complaining of pain from her large hemorrhoids.  She is using Anusol cream, dermoplast, witch hazel pads, and mag packs.  She is also taking prn Tylenol and Ibuprofen which she states are helping.  She is also lying on her sides instead of on her back.      Problem: Postpartum (Vaginal Delivery)  Goal: Effective Urinary Elimination  Outcome: Progressing  Intervention: Monitor and Manage Urinary Retention  Recent Flowsheet Documentation  Taken 11/9/2023 1010 by Brandee Blanca RN  Urinary Elimination Promotion: frequent voiding encouraged   Patient is voiding independently, spontaneusly, and without difficulty.      Problem: Adult Inpatient Plan of Care  Goal: Readiness for Transition of Care  Outcome: Progressing   Patient has asked if she can discharge today after 24 hour testing for Baby is done.  Discharge orders are in as long as she passes all screens at 15:35.

## 2023-11-09 NOTE — PROGRESS NOTES
Late entry    I was called as IHOB to review strip of TOLAC.  She was anterior lip with category strip with fetal bradycardia versus prolonged decelerations.   Dr. Acevedo attempted a vacuum while I=the patient was on her side in the bed.  I instructed him to have the patient switched to her back, have the bed broken.  The team was called and vacuum was attempted but the head was slow in  coming out and a should dystocia was encountered for which I had to help get the shoulders delivered.  Suprapubic and yonatan were employed as well.   The infant was passed out to the NICU team in attendance.     Dr. Acevedo stated her no longer needed help at this point and I exited the room    30 minutes spent in help with the delivery    Agnieszka Johnson MD

## 2023-11-09 NOTE — PLAN OF CARE
VSS. Up ad jarrett. Breastfeeding and tolerating well, encouraged to call for help when needed. Scant amount of lochia, no clots noted. Tylenol and Ibuprofen for pain control. Ice, tucks, dermoplast, and mag packs provided for added comfort. BS audible/active x4, tolerating PO, denies N/V, patient has multiple swollen hemorrhoids and hydrocortisone cream applied. Voiding. Bonding well with infant, attentive to cares. Significant other at bedside and supportive. Continue with plan of care.

## 2023-11-09 NOTE — DISCHARGE SUMMARY
Maternal Discharge Summary  [facility] Maternity Care  Date of Service: 2023    Name      Fabienne Clemente         1995  MRN       [id number]  PCP        No Ref-Primary, Physician    Admit Date:  2023  Discharge Date:  2023    Principal Diagnosis:    Patient Active Problem List   Diagnosis    History of  delivery    BMI 24.0-24.9, adult    Macrosomic baby    Maternal serum screen positive for trisomy 21    Vacuum-assisted vaginal delivery       Delivery Type: , vacuum     Hospital Course:  Fabienne Clemente is a 28 year old now  s/p , vacuum  at 40w2d on 23.  The patient's hospital course was unremarkable.  She was admitted in active labor.  Had successful , vacuum assisted.  Postpartum bothered by ongoing hemorrhoid issues, which were treated in hospital with mag packs, hydrocortisone cream, tub soaks.  Referred to Colorectal for outpatient consult.   She recovered as anticipated and experienced no post-delivery complications.  On discharge, her pain was well controlled.  Vaginal bleeding is normal.  Voiding without difficulty.  Ambulating well and tolerating a normal diet.  No fevers.       Conditions complicating Pregnancy:  Other Complications/Significant Findings:  history prior C/S    Procedure(s) Performed: vacuum assisted vaginal delivery, repair second degree perineal  laceration    Discharge Plan:   Discharge to Home. Condition at Discharge:  stable  Physical activity: Regular.  Diet:  Regular.  Home care nurse:  not ordered, not covered by insurance .  Contraception plan: undecided, will follow up at postpartum visit.  Follow up with Dr. Acevedo, Physician in 6 weeks.    Discharge Medications:   Current Discharge Medication List        START taking these medications    Details   docusate sodium (COLACE) 100 MG capsule Take 2 capsules (200 mg) by mouth 2 times daily  Qty: 10 capsule, Refills: 0    Associated Diagnoses: Vacuum-assisted  vaginal delivery      hydrocortisone, Perianal, (ANUSOL-HC) 2.5 % cream Place rectally 3 times daily as needed for hemorrhoids  Qty: 30 g, Refills: 0    Associated Diagnoses: Thrombosed external hemorrhoids           CONTINUE these medications which have NOT CHANGED    Details   fish oil-omega-3 fatty acids 1000 MG capsule Take 2 g by mouth daily      multivitamin w/minerals (THERA-VIT-M) tablet Take 1 tablet by mouth daily      Probiotic Product (PROBIOTIC-10 PO)            STOP taking these medications       Biotin 10 MG CAPS Comments:   Reason for Stopping:         nitroFURantoin macrocrystal (MACRODANTIN) 50 MG capsule Comments:   Reason for Stopping:               Allergies: No Known Allergies    Discharge Exam:  BP (P) 119/68 (BP Location: Left arm, Patient Position: Semi-Rolle's)   Pulse (P) 82   Temp (P) 98  F (36.7  C) (Oral)   Resp (P) 16   SpO2 (P) 96%   Breastfeeding Unknown   General - alert, comfortable  Lungs - CTA bilaterally  Abdomen - fundus firm, nontender, below umbilicus  Extremities - trace edema    Post-partum hemoglobin:   Hemoglobin   Date Value Ref Range Status   11/08/2023 14.8 11.7 - 15.7 g/dL Final       Greater than 30 minutes were spent on this discharge on the day of service.      Completed by:   Fabienne Flores MD  [facility]  11/9/2023 10:14 AM

## 2023-11-09 NOTE — PLAN OF CARE
Problem: Adult Inpatient Plan of Care  Goal: Plan of Care Review  Outcome: Progressing   Goal Outcome Evaluation:         Patient sates she is bonding with baby and breastfeeding has been been successful with effective latches. Patient has gotten up to the bathroom and went into the tub. Patient was not able to void. RN bladder scanned patient and pt had more than 150ml. Patient was straight cath-ed and had an output of 300ml. Support person at bedside and attentive to patient needs.

## 2023-11-10 NOTE — PLAN OF CARE
Patient is stable at the time of discharge. Patient was given discharge instructions and education and verbalized understanding of the materials given. Patient will follow up in clinic in the next 2 and 6 weeks with provider or sooner if concerns should arise. No further questions or concerns.    Margaret Salvador RN

## 2023-12-20 NOTE — PROCEDURES
OB Vaginal Delivery Note Addendum  Please see initial labor and delivery note from  and late ob/gynconsult note that was entered on  at 1746.  There are no nursing notes that I can easily find that discuss patient's arrival, labor progression, fetal monitoring.  First nursing note is 1638 following delivery.    Fabienne Clemente MRN# [id number]   Age: 28 year old YOB: 1995       GA: 40w2d  GP:   Labor Complications: Fetal Intolerance   Delivery QBL: 300 mL  Delivery Type: , Vacuum   ROM to Delivery Time: rupture date or rupture time have not been documented   Weight: 4.3 kg (9 lb 7.7 oz)    1 Minute 5 Minute 10 Minute   Apgar Totals: 7   8        ALPA POTTER;CARY GIBSON;BALDEV FUENTES;JOJO KIRBY;MELISSA ROBISON     Delivery Details:  Fabienne Clemente, a 28 year old  female delivered a viable infant with apgars of 7  and 8 .     Patient and  called her primary care clinic with concerns that she was leaking amniotic fluid and having contractions.  As I was in a patient room in clinic, they were asked by my staff to call [facility] labor and delivery but arrived to the hospital unannounced.  Patient was emotionally frantic when she arrived as she was feeling quite a bit of lower pelvic pressure.     Approximately 10 minutes after the patient arrived to the hospital, I came into the room.  Patient was in some distress, there was some difficulty with a fetal tracing initially as she was moving.   There was approximately 5 minutes tracing present when I arrived.  Nursing staff was informed that she was a TOLAC patient and required appropriate labwork and IV access.  This was accomplished over the course of about 20 minutes or so.      Within about 15 minutes of obtaining a fetal tracing, it was apparent that there was a category 2 fetal tracing.  Fetal baseline seemed to vary between 100 to 120.  There was moderate variability.  It was difficult to discern  if there were fetal decels.  There were no fetal accels.  Dr. Johnson was called to the room to evaluate the category 2 tracing.  Outside the room I requested Dr. Johnson evaluate the tracing and stay until we had firm plans to expedite the delivery or until the category 2 tracing had resolved.  Dr Johnson was in agreement with this plan.    I subsequently performed a cervical check and found the patient to be 9.5cm (anterior lip)/100/-1.  Dr. Johnson felt that it was appropriate at that time to proceed with pushing.  Fabienne provided fair effort with pushes.  She required quite a bit of direction by nursing staff to push with contractions.  She was able to do this on her right side for approximately 10 minutes and for about 4 contractions.  The fetal bradycardia was persistent enough that Dr. Johnson felt that expedited delivery was warranted indicated that a vacuum was to be used.  I informed Fabienne that a vacuum was recommended at that time as the baby's heartbeat was low.  I also informed her that a  would be needed if a vacuum delivery proved difficult.  Dr. Johnson stated her confidence to the patient that a vacuum delivery would be successful.  A cervical check was repeated by myself; it was 10cm/100%/+2    The patient remained uncomfortable with her contractions and had a somewhat difficult time being directed towards the end of the bed.  Once Fabienne was at the end of the bed, the vacuum was applied and used.  Please see the details of the vacuum use from the 1st delivery note.      A shoulder dystocia was identified and addressed.  Dr. Johnson remained in the room through the entire process.  The baby was moved to the warmer for further evaluation by the NICU staff once it was found that baby was only intermittently crying and had irregular respirations.  Please see their note.  With the delivery completed, Dr. Johnson asked if I felt comfortable with her leaving and subsequently left.            Delivery was  via , vacuum  to a sterile field under local  anesthesia. Infant delivered in vertex  left  occiput  anterior  position. Anterior and posterior shoulders delivered without difficulty. The cord was clamped, cut twice and 3 vessels  were noted. Cord blood was obtained in routine fashion with the following disposition: lab .      Cord complications: none   Placenta delivered at 2023  3:40 PM . Placental disposition was Hospital disposal . Fundal massage performed and fundus found to be firm.     Episiotomy: none    Perineum, vagina, cervix were inspected, and the following lacerations were noted:   Perineal lacerations: 2nd                Excellent hemostasis was noted. Needle count correct. Infant and patient in delivery room in good and stable condition.        Katarina Clemente [[id number]]    Labor Length    3rd Stage (hrs): 0 (min): 5      Labor Events     labor?: No   steroids: None  Labor Type: TOLAC (Trial of Labor After )  Predominate monitoring during 1st stage: continuous electronic fetal monitoring     Antibiotics received during labor?: No     Rupture identifier: Sac 1  Rupture date/time:     Rupture type: Spontaneous Rupture of Membranes     Augmentation: None     Delivery/Placenta Date and Time    Delivery Date: 23 Delivery Time:  3:35 PM   Placenta Date/Time: 2023  3:40 PM  Oxytocin given at the time of delivery: after delivery of placenta  Delivering clinician: Eliu Acevedo MD   Other personnel present at delivery:  Provider Role   Kyra Denton, Janette Kelly RN Thurber, Lindsey M, RN Schwartz, Michaela K, PAMeghnaC    Dary George, RN          Vaginal Counts     Initial count performed by 2 team members:  Two Team Members   Curtis Denton       Cozad Suture Needles Sponges (RETIRED) Instruments   Initial counts 0 0 5    Added to count 1 1 0    Relief counts       Final counts 1 1 5          Placed during labor Accounted for at the end of  labor   FSE Yes Yes   IUPC No NA   Cervidil No NA              Final count performed by 2 team members:  Two Team Members   Corrie Acevedo      Final count correct?: Yes  Pre-Birth Team Brief: Complete  Post-Birth Team Debrief: Complete     Apgars    Living status: Living   1 Minute 5 Minute 10 Minute 15 Minute 20 Minute   Skin color: 0  1       Heart rate: 2  2       Reflex irritability: 2  2       Muscle tone: 2  2       Respiratory effort: 1  1       Total: 7  8       Apgars assigned by: JOJO KIRBY PA-C     Cord    Vessels: 3 Vessels    Cord Complications: None               Cord Blood Disposition: Lab    Gases Sent?: Yes    Delayed cord clamping?: Yes    Cord Clamping Delay (seconds): 31-60 seconds    Stem cell collection?: No        Resuscitation    Methods: Oxygen, NCPAP, Oximetry   Care at Delivery: Asked by Dr. Acevedo and Dr. Jonhson to attend the delivery of this term, female infant with a gestational age of 40 2/7 weeks secondary to category II fetal heart tracing, vacuum-assisted delivery (pop-off x1), and shoulder dystocia.      Infant was born via vaginal delivery with vacuum-assist device on 2023 at 15:25 hours. Approximately thirty seconds of delayed cord clamping were completed in which the infant was stimulated and dried. She had adequate tone, but very intermittent cries without spontaneous respirations; thus, decision made to clamp and cut the cord. She was placed on a pre-heated warmer and further dried, stimulated, and bulb suctioned of the mouth and nose. Upon auscultation, she sounded coarse over bilateral lung fields. Delee suctioned of the mouth and nose for ~4 mL of clear secretions. At about 3:30 of life, infant with grunting and skin pale. Decision to initiate CPAP PEEP 5+ FiO2 21% at four minutes of life. Pulse oximetry placed on infant's right wrist revealed SpO2 in the low 80s, that quickly rita to >92% after starting CPAP. CPAP continued for five minutes and  "discontinued at nine minutes of life. At that time, infant's skin was pink, spontaneous respirations with loud lusty cry. SpO2 remained >92% for several minutes after discontinuing CPAP. Gross PE is WNL except for mild edema over the right parieto-occipital skull; though there is no bogginess or fluid wave. Clavicles and humerus bones appear in tact without crepitus.  Infant required no further resuscitation.  Infant was shown to mother and father, handoff to nursery nurse and will be remain in the NBN in [facility] for further care.    Laina Miller PA-C 2023 3:59 PM    Output in Delivery Room: Stool      Measurements    Weight: 9 lb 7.7 oz Length: 1' 8.5\"   Head circumference: 34 cm    Output in delivery room: Stool     Skin to Skin and Feeding Plan    Skin to skin initiation date/time: 1841    Skin to skin with: Mother  Skin to skin end date/time:        Labor Events and Shoulder Dystocia    Fetal Tracing Prior to Delivery: Category 2, Category 1  Fetal Tracing Comments: moderate variability, variable decels, fetal bradycardia  Shoulder dystocia present?: Pos   Time body delivered: 1535 CST   Time head delivered: 1534 CST Help called by: xiomy bernard   Time recognized: 2023 1535    Time help called: 2023 1535    NICU arrived: 2023 1530    Gentle attempt at traction, assisted by maternal expulsive forces?: Yes       First Maneuver: Mikel maneuver, Suprapubic pressure    Time performed: 2023 1534       Delivery (Maternal) (Provider to Complete) ([id number])    Episiotomy: None  Perineal lacerations: 2nd    Repair suture: 3-0 Vicryl  Number of repair packets: 1  Genital tract inspection done: Pos     Blood Loss  Mother: Fabienne Clemente #[id number]   Start of Mother's Information    Delivery Blood Loss  23 0335 - 23 1535    Delivery QBL (mL) Hospital Encounter 300 mL    Postpartum QBL (mL) Hospital Encounter 351 mL    Total  651 mL         End of Mother's Information  " Mother: Fabienne Clemente #[id number]          Delivery - Provider to Complete ([id number])    Delivering clinician: Eliu Acevedo MD  Delivery Type (Choose the 1 that will go to the Birth History): , Vacuum    Verbal Informed Consent Obtained For Vacuum: Yes Alternate Labor Strategies Discussed (vacuum): Yes    Emergency Resources Available (vacuum): Charge Nurse/Team, Resuscitation Team   Type (vacuum): kiwi Accrued Pulling Time (vacuum): 30 seconds      # of Pop-Offs (vacuum): 1 # of Pulls/Contractions (vacuum): 3   Position (vacuum): OA Fetal Station (vacuum): +2      Indication for Operative Vaginal Delivery (vacuum): Fetal Status   Operative Vaginal Delivery Brief Note Vacuum: There was fetal bradycardia present with decels and moderate variabilty.  Vacuum discussed briefly with patient and  and they consented.  NICU and extra staff requested to the room.  Mom was an incredibly effective at pushing.  The vacuum was applied and one pull with the first contraction.  There was a pop-off with the second pull; the vacuum was reapplied and the third pull resulted in delivery.  It was noted that the anterior shoulder was not easily delivered and that there was a turtle sign.  Mikel and suprapubic pressure resolved the shoulder dystocia and the baby was easily delivered thereafter.  Baby was on mom's chest for 10 seconds but due to grunting and a higher heart rate, baby was evaluated and treated at the warmer for a short period of time- and then returned to mom thereafter.  Cord gases ordered.              Other personnel:  Provider Role   Kyra Denton RN Wyffels, Olivia, RN Thurber, Lindsey M, RN Schwartz, Michaela K, PA-C Kiesling, Nicole C, RN                 Placenta    Date/Time: 2023  3:40 PM  Removal: Spontaneous  Disposition: Hospital disposal           Anesthesia    Method: Local                Presentation and Position    Presentation: Vertex    Position: Left Occiput Anterior                  Eliu Acevedo MD

## 2024-03-03 ENCOUNTER — HEALTH MAINTENANCE LETTER (OUTPATIENT)
Age: 29
End: 2024-03-03

## 2024-08-30 ENCOUNTER — HOSPITAL ENCOUNTER (OUTPATIENT)
Dept: GENERAL RADIOLOGY | Facility: HOSPITAL | Age: 29
Discharge: HOME | End: 2024-08-30
Attending: PHYSICIAN ASSISTANT | Admitting: PHYSICIAN ASSISTANT
Payer: COMMERCIAL

## 2024-08-30 ENCOUNTER — OFFICE VISIT (OUTPATIENT)
Dept: FAMILY MEDICINE | Facility: CLINIC | Age: 29
End: 2024-08-30
Payer: COMMERCIAL

## 2024-08-30 VITALS
OXYGEN SATURATION: 100 % | DIASTOLIC BLOOD PRESSURE: 86 MMHG | BODY MASS INDEX: 28.55 KG/M2 | WEIGHT: 180.9 LBS | RESPIRATION RATE: 18 BRPM | TEMPERATURE: 98.2 F | SYSTOLIC BLOOD PRESSURE: 135 MMHG | HEART RATE: 77 BPM

## 2024-08-30 DIAGNOSIS — M25.532 LEFT WRIST PAIN: ICD-10-CM

## 2024-08-30 DIAGNOSIS — M25.532 LEFT WRIST PAIN: Primary | ICD-10-CM

## 2024-08-30 DIAGNOSIS — S63.502A SPRAIN OF LEFT WRIST, INITIAL ENCOUNTER: ICD-10-CM

## 2024-08-30 PROCEDURE — 99203 OFFICE O/P NEW LOW 30 MIN: CPT | Performed by: PHYSICIAN ASSISTANT

## 2024-08-30 PROCEDURE — 73110 X-RAY EXAM OF WRIST: CPT | Mod: LT

## 2024-08-30 NOTE — PATIENT INSTRUCTIONS
Splint for pain, swelling, rest.   Avoid weight bearing through the area.    Return if not improved in 2 weeks (or [facility] urgent care would be appropriate).      Holding off on repeat scaphoid views as you are not tender in that area, but if it changes we are happy to reassess.

## 2024-08-30 NOTE — PROGRESS NOTES
Assessment & Plan     Left wrist pain    - XR Wrist Left G/E 3 Views    Sprain of left wrist, initial encounter  Patient was seen for left wrist pain following trauma.  She has tenderness of the mid carpal bones and ulnar styloid region.  She specifically does not have tenderness at the anatomical snuffbox and has no pain with range of motion of the thumb.  X-ray was negative for acute fracture.  There was a subtle lucency at the scaphoid waist seen on the PA view only though potentially artifact per radiology.  There is no clinical concern given no radial side wrist pain and no anatomical snuffbox.  I discussed this with the patient.  We will treat for wrist sprain with a wrist brace she may take off and on as needed and will wean as tolerated by pain.  Recommend ice, ibuprofen, and follow-up if symptoms are persisting greater than 2 weeks.  I did offer a scaphoid view today however with shared decision making we decided to avoid that given she has no tenderness to palpation at the anatomical snuffbox and if symptoms persist it could be done on repeat x-rays of the wrist in 2 weeks.  - Wrist/Arm/Hand Bracing Supplies Order Wrist Brace; Left; non-thumb debbie Pickard PA-C  [facility] PAM Loving is a 28 year old female who presents to clinic today for the following health issues:  Chief Complaint   Patient presents with    Musculoskeletal Problem     Yesterday left wrist hit with ball while playing soccer.       HPI  Pt received blow to the L wrist with a soccer ball followed by increased wrist pain yesterday.  Did not land on outstretched hand.    Had some mild wrist pain the week prior but can not think of any repetitive activities.  It improved without diffiuclty until hit by the ball causing worsening.    Review of Systems  Constitutional, HEENT, cardiovascular, pulmonary, gi and gu systems are negative, except as otherwise noted.      Objective    BP  135/86 (BP Location: Right arm, Patient Position: Sitting, Cuff Size: Adult Regular)   Pulse 77   Temp 98.2  F (36.8  C) (Oral)   Resp 18   Wt 82.1 kg (180 lb 14.4 oz)   LMP 08/26/2024 (Exact Date)   SpO2 100%   Breastfeeding Yes   BMI 28.55 kg/m    Physical Exam   Nad appears well  Exam of the wrist reveals no swelling or ecchymosis. No deformity.   TTP mid carpal bones and L ulnar styloid process area. Pain with AROM in flexion, extension, pronation and supination.   Sensation and peripheral pulses intact, cap refill brisk.    Results for orders placed or performed during the hospital encounter of 08/30/24   XR Wrist Left G/E 3 Views     Status: None    Narrative    EXAM: XR WRIST LEFT G/E 3 VIEWS  LOCATION: [facility]  DATE: 8/30/2024    INDICATION: pain, swelling, trauma x  1 day  COMPARISON: None.      Impression    IMPRESSION:     There is some lucency to the scaphoid waist, seen only on the PA view. This could be artifactual but if there is clinical concern for scaphoid fracture a dedicated scaphoid view would be recommended.    Otherwise negative for left wrist fracture. Normal joint spacing.

## 2025-03-16 ENCOUNTER — HEALTH MAINTENANCE LETTER (OUTPATIENT)
Age: 30
End: 2025-03-16

## (undated) DEVICE — GLOVE UNDER INDICATOR PI SZ 7.0 LF 41670

## (undated) DEVICE — PREP CHLORAPREP 26ML TINTED HI-LITE ORANGE 930815

## (undated) DEVICE — PAD MATERNITY ADHESIVE STRIP PEACH UNSCENTED LATEX FH1001

## (undated) DEVICE — BAG BIOHAZARD RED SMALL 24X23" A4823PR

## (undated) DEVICE — SUCTION KIWI VAC  VAC-6000M

## (undated) DEVICE — SUTURE VICRYL+ 0 36IN CT-1 UND VCP946H

## (undated) DEVICE — CUSTOM PACK C-SECTION LHE

## (undated) DEVICE — PACK MINOR SINGLE BASIN SSK3001

## (undated) DEVICE — GLOVE PI ULTRATCH M LF SZ 6.5 PF CUFF TEXT STRL LF 42665

## (undated) DEVICE — SOL NACL 0.9% IRRIG 1000ML BOTTLE 2F7124

## (undated) DEVICE — SUCTION MITY VAC MUSHROOM CUP 10007LP

## (undated) DEVICE — PLATE GROUNDING ADULT W/CORD 9165L

## (undated) DEVICE — DRSG PRIMAPORE 04X11 3/4"

## (undated) DEVICE — UNDERPAD 30X30 BULK 949B10